# Patient Record
Sex: FEMALE | Race: WHITE | NOT HISPANIC OR LATINO | Employment: UNEMPLOYED | URBAN - METROPOLITAN AREA
[De-identification: names, ages, dates, MRNs, and addresses within clinical notes are randomized per-mention and may not be internally consistent; named-entity substitution may affect disease eponyms.]

---

## 2023-05-12 ENCOUNTER — HOSPITAL ENCOUNTER (EMERGENCY)
Facility: HOSPITAL | Age: 43
Discharge: HOME | End: 2023-05-12
Attending: SURGERY
Payer: MEDICAID

## 2023-05-12 VITALS
WEIGHT: 136.69 LBS | RESPIRATION RATE: 20 BRPM | SYSTOLIC BLOOD PRESSURE: 114 MMHG | TEMPERATURE: 98 F | OXYGEN SATURATION: 98 % | HEART RATE: 66 BPM | BODY MASS INDEX: 25.83 KG/M2 | DIASTOLIC BLOOD PRESSURE: 71 MMHG

## 2023-05-12 DIAGNOSIS — R21 RASH AND NONSPECIFIC SKIN ERUPTION: Primary | ICD-10-CM

## 2023-05-12 DIAGNOSIS — L30.9 DERMATITIS: ICD-10-CM

## 2023-05-12 PROCEDURE — 99284 EMERGENCY DEPT VISIT MOD MDM: CPT

## 2023-05-12 PROCEDURE — 96372 THER/PROPH/DIAG INJ SC/IM: CPT | Performed by: SURGERY

## 2023-05-12 PROCEDURE — 63600175 PHARM REV CODE 636 W HCPCS: Performed by: SURGERY

## 2023-05-12 RX ORDER — HYDROXYZINE PAMOATE 25 MG/1
25 CAPSULE ORAL 4 TIMES DAILY
Qty: 12 CAPSULE | Refills: 0 | Status: SHIPPED | OUTPATIENT
Start: 2023-05-12 | End: 2023-05-15

## 2023-05-12 RX ORDER — TRIAMCINOLONE ACETONIDE 1 MG/G
CREAM TOPICAL 2 TIMES DAILY
Qty: 45 G | Refills: 0 | Status: SHIPPED | OUTPATIENT
Start: 2023-05-12

## 2023-05-12 RX ORDER — DEXAMETHASONE SODIUM PHOSPHATE 4 MG/ML
8 INJECTION, SOLUTION INTRA-ARTICULAR; INTRALESIONAL; INTRAMUSCULAR; INTRAVENOUS; SOFT TISSUE
Status: COMPLETED | OUTPATIENT
Start: 2023-05-12 | End: 2023-05-12

## 2023-05-12 RX ADMIN — DEXAMETHASONE SODIUM PHOSPHATE 8 MG: 4 INJECTION, SOLUTION INTRA-ARTICULAR; INTRALESIONAL; INTRAMUSCULAR; INTRAVENOUS; SOFT TISSUE at 08:05

## 2023-05-12 NOTE — ED PROVIDER NOTES
Encounter Date: 5/12/2023       History     Chief Complaint   Patient presents with    Rash     Patient complains of rash to face which erupted last night.  Patient has baseline rosacea of the face and states that she received a shot for HIV prevention which I am not familiar with and also put on antibiotics which she does not know the name of for sinus infection but discontinued those weeks ago she denies any respiratory complaints the rash is confined to her face    The history is provided by the patient.   Rash   This is a new problem. The current episode started yesterday. The problem has been gradually worsening. The problem is associated with an unknown factor.   Review of patient's allergies indicates:   Allergen Reactions    Iodinated contrast media      No past medical history on file.  No past surgical history on file.  No family history on file.  Social History     Tobacco Use    Smoking status: Never     Review of Systems   Constitutional: Negative.    HENT: Negative.     Respiratory: Negative.     Cardiovascular: Negative.    Gastrointestinal: Negative.    Endocrine: Negative.    Genitourinary: Negative.    Musculoskeletal: Negative.    Skin:  Positive for rash.   Allergic/Immunologic: Negative.    Neurological: Negative.    Hematological: Negative.    Psychiatric/Behavioral: Negative.       Physical Exam     Initial Vitals [05/12/23 0810]   BP Pulse Resp Temp SpO2   114/71 66 20 97.6 °F (36.4 °C) 98 %      MAP       --         Physical Exam    Nursing note and vitals reviewed.  Constitutional: She appears well-developed and well-nourished.   HENT:   Mouth/Throat: Oropharynx is clear and moist.   Neck:   Normal range of motion.  Cardiovascular:  Normal rate.           Pulmonary/Chest: Breath sounds normal.   Musculoskeletal:      Cervical back: Normal range of motion.     Skin: Skin is warm. Rash noted.   Maculopapular eruption over both cheeks forehead and face no evidence of any impetigo    Psychiatric: She has a normal mood and affect.       ED Course   Procedures  Labs Reviewed - No data to display       Imaging Results    None          Medications   dexAMETHasone injection 8 mg (8 mg Intramuscular Given 5/12/23 3175)     Medical Decision Making:   Initial Assessment:   Facial dermatitis  ED Management:  Unknown allergen possible antibiotics or a shot she received recommend treatment with steroids and follow up as needed                        Clinical Impression:   Final diagnoses:  [R21] Rash and nonspecific skin eruption (Primary)  [L30.9] Dermatitis        ED Disposition Condition    Discharge Stable          ED Prescriptions       Medication Sig Dispense Start Date End Date Auth. Provider    triamcinolone acetonide 0.1% (KENALOG) 0.1 % cream Apply topically 2 (two) times daily. 45 g 5/12/2023 -- BRIAN Bucio III, MD    hydrOXYzine pamoate (VISTARIL) 25 MG Cap Take 1 capsule (25 mg total) by mouth 4 (four) times daily. for 3 days 12 capsule 5/12/2023 5/15/2023 BRIAN Bucio III, MD          Follow-up Information    None          BRIAN Bucio III, MD  05/12/23 4618

## 2023-05-12 NOTE — ED TRIAGE NOTES
42 y.o. female presents to ER Room/bed info not found   Chief Complaint   Patient presents with    Rash   .   C/o rash to face since last night

## 2023-07-05 ENCOUNTER — HOSPITAL ENCOUNTER (EMERGENCY)
Facility: HOSPITAL | Age: 43
Discharge: HOME | End: 2023-07-05
Attending: STUDENT IN AN ORGANIZED HEALTH CARE EDUCATION/TRAINING PROGRAM
Payer: MEDICAID

## 2023-07-05 VITALS
BODY MASS INDEX: 25.66 KG/M2 | WEIGHT: 135.94 LBS | SYSTOLIC BLOOD PRESSURE: 105 MMHG | DIASTOLIC BLOOD PRESSURE: 62 MMHG | RESPIRATION RATE: 18 BRPM | HEART RATE: 59 BPM | OXYGEN SATURATION: 98 % | HEIGHT: 61 IN | TEMPERATURE: 97 F

## 2023-07-05 DIAGNOSIS — R21 RASH: Primary | ICD-10-CM

## 2023-07-05 LAB — B-HCG UR QL: NEGATIVE

## 2023-07-05 PROCEDURE — 96372 THER/PROPH/DIAG INJ SC/IM: CPT | Mod: 59 | Performed by: STUDENT IN AN ORGANIZED HEALTH CARE EDUCATION/TRAINING PROGRAM

## 2023-07-05 PROCEDURE — 81025 URINE PREGNANCY TEST: CPT | Performed by: STUDENT IN AN ORGANIZED HEALTH CARE EDUCATION/TRAINING PROGRAM

## 2023-07-05 PROCEDURE — 25000003 PHARM REV CODE 250: Performed by: STUDENT IN AN ORGANIZED HEALTH CARE EDUCATION/TRAINING PROGRAM

## 2023-07-05 PROCEDURE — 99284 EMERGENCY DEPT VISIT MOD MDM: CPT | Mod: 25

## 2023-07-05 PROCEDURE — 96374 THER/PROPH/DIAG INJ IV PUSH: CPT

## 2023-07-05 PROCEDURE — 63600175 PHARM REV CODE 636 W HCPCS: Performed by: STUDENT IN AN ORGANIZED HEALTH CARE EDUCATION/TRAINING PROGRAM

## 2023-07-05 RX ORDER — DIPHENHYDRAMINE HYDROCHLORIDE 50 MG/ML
25 INJECTION INTRAMUSCULAR; INTRAVENOUS
Status: COMPLETED | OUTPATIENT
Start: 2023-07-05 | End: 2023-07-05

## 2023-07-05 RX ORDER — DIPHENHYDRAMINE HCL 25 MG
25 CAPSULE ORAL EVERY 6 HOURS PRN
Qty: 20 CAPSULE | Refills: 0 | Status: ON HOLD | OUTPATIENT
Start: 2023-07-05 | End: 2023-10-17 | Stop reason: HOSPADM

## 2023-07-05 RX ORDER — CEPHALEXIN 500 MG/1
500 CAPSULE ORAL 4 TIMES DAILY
Qty: 20 CAPSULE | Refills: 0 | Status: SHIPPED | OUTPATIENT
Start: 2023-07-05 | End: 2023-07-10

## 2023-07-05 RX ORDER — CEPHALEXIN 500 MG/1
500 CAPSULE ORAL
Status: COMPLETED | OUTPATIENT
Start: 2023-07-05 | End: 2023-07-05

## 2023-07-05 RX ORDER — DEXAMETHASONE SODIUM PHOSPHATE 4 MG/ML
8 INJECTION, SOLUTION INTRA-ARTICULAR; INTRALESIONAL; INTRAMUSCULAR; INTRAVENOUS; SOFT TISSUE
Status: COMPLETED | OUTPATIENT
Start: 2023-07-05 | End: 2023-07-05

## 2023-07-05 RX ADMIN — DIPHENHYDRAMINE HYDROCHLORIDE 25 MG: 50 INJECTION, SOLUTION INTRAMUSCULAR; INTRAVENOUS at 08:07

## 2023-07-05 RX ADMIN — DEXAMETHASONE SODIUM PHOSPHATE 8 MG: 4 INJECTION, SOLUTION INTRA-ARTICULAR; INTRALESIONAL; INTRAMUSCULAR; INTRAVENOUS; SOFT TISSUE at 08:07

## 2023-07-05 RX ADMIN — CEPHALEXIN 500 MG: 500 CAPSULE ORAL at 08:07

## 2023-07-05 NOTE — ED PROVIDER NOTES
Encounter Date: 7/5/2023       History     Chief Complaint   Patient presents with    Rash     42-year-old female with history of contrast allergy presenting with rash to her face and arms.  Patient reports that she has had rashes like this previously, they currently occur about once a month.  Patient reports that this bout is worse than usual, and she is developed pustules on the left side of her face.  She was seen in an emergency room yesterday, and given a steroid cream.  Patient reports limited improvement with this.  She reports that one month ago she was treated with prednisone which resolved her condition.  She denies fever, but states that her face does feel warm.  No trouble swallowing or trouble breathing.  Patient reports she is started nursing rash to bilateral arms now.  No chest tightness or shortness of breath.  Yesterday patient had reported a numbness to her right lip, however states this has totally resolved.  No numbness or weakness at this time.    Review of patient's allergies indicates:   Allergen Reactions    Iodinated contrast media      History reviewed. No pertinent past medical history.  History reviewed. No pertinent surgical history.  History reviewed. No pertinent family history.  Social History     Tobacco Use    Smoking status: Never   Substance Use Topics    Alcohol use: Yes     Comment: occ     Review of Systems   Constitutional:  Negative for fever.   HENT:  Negative for sore throat.    Respiratory:  Negative for shortness of breath.    Cardiovascular:  Negative for chest pain.   Gastrointestinal:  Negative for nausea.   Genitourinary:  Negative for dysuria.   Musculoskeletal:  Negative for back pain.   Skin:  Positive for rash.   Neurological:  Negative for weakness and numbness.   Hematological:  Does not bruise/bleed easily.     Physical Exam     Initial Vitals [07/05/23 0744]   BP Pulse Resp Temp SpO2   122/73 65 18 99 °F (37.2 °C) 98 %      MAP       --         Physical  Exam    Nursing note and vitals reviewed.  Constitutional: She appears well-developed.   HENT:   Head: Normocephalic.   Eyes: Pupils are equal, round, and reactive to light.   Neck:   Normal range of motion.  Cardiovascular:            No murmur heard.  Pulmonary/Chest: No respiratory distress.   Abdominal: Abdomen is soft.   Musculoskeletal:         General: No edema.      Cervical back: Normal range of motion.     Neurological: She is alert.   Patient alert and oriented x3, fluent with speech and full comprehension.  5/5 strength bilateral upper and lower extremities.  Ambulatory with normal gait.  Patient has no facial droop.   Skin: Skin is warm.   Pustular rash to bilateral cheeks and chin.  There are no vesicular lesions.  The rash is bilateral.  No signs of urticarial rash.  There is very mild erythema and pustules to bilateral upper arms.   Psychiatric: She has a normal mood and affect.                 ED Course   Procedures  Labs Reviewed   PREGNANCY TEST, URINE RAPID    Narrative:     Specimen Source->Urine          Imaging Results    None          Medications   dexAMETHasone injection 8 mg (8 mg Intravenous Given 7/5/23 0816)   diphenhydrAMINE injection 25 mg (25 mg Intramuscular Given 7/5/23 0816)   cephALEXin capsule 500 mg (500 mg Oral Given 7/5/23 0816)     Medical Decision Making:   Differential Diagnosis:   DDX:  Rash.  Concern for possible allergic reaction versus cellulitis. Unlikely necrotizing fasciitis given history, exam, nontoxic appearing, no crepitus, no risk factors.  Do not suspect rashes patient has no neurologic deficits on my exam, and had recent negative CT.  DX:  U preg  TX:  Steroid, antibiotic, Benadryl  Dispo:  Discharge with close dermatology follow-up           ED Course as of 07/05/23 0902 Wed Jul 05, 2023   0901 Jesus Snyder 9:01 AM  Symptoms improved. Discussed results, findings, supportive care, follow up recommendations, and return to ED precautions with this  patient. Patient verbalized understanding and agreement.  Patient is stable for discharge at this time.   [NB]   0902 Do not suspect TN or SJS as patient has not started any new medications, and there are no oral ulcerations or rash.  No palmar rash. [NB]      ED Course User Index  [NB] Jesus Snyder MD                 Clinical Impression:   Final diagnoses:  [R21] Rash (Primary)        ED Disposition Condition    Discharge Stable          ED Prescriptions       Medication Sig Dispense Start Date End Date Auth. Provider    diphenhydrAMINE (BENADRYL) 25 mg capsule Take 1 capsule (25 mg total) by mouth every 6 (six) hours as needed for Itching or Allergies. 20 capsule 7/5/2023 -- Jesus Snyder MD    cephALEXin (KEFLEX) 500 MG capsule Take 1 capsule (500 mg total) by mouth 4 (four) times daily. for 5 days 20 capsule 7/5/2023 7/10/2023 Jesus Snyder MD          Follow-up Information       Follow up With Specialties Details Why Contact Info    [facility] - Emergency Dept Emergency Medicine Schedule an appointment as soon as possible for a visit in 2 days  [address] [facility] [address]  [phone]    [facility] DERMATOLOGY Dermatology Schedule an appointment as soon as possible for a visit in 2 days  [address] [facility] [address]  [phone]             Jesus Snyder MD  07/05/23 0801       Jesus Snyder MD  07/05/23 0804       Jesus Snyder MD  07/05/23 0903

## 2023-07-05 NOTE — DISCHARGE INSTRUCTIONS
Please follow up with your primary care physician within 2 days. Ensure that you review all lab work results and/or imaging results. If you have any questions about your discharge paperwork please call the Emergency Department.     Return to the Emergency Department if any fever, increasing pain, redness, or discharge appear.     If you were prescribed antibiotics, please take them to completion. If you were prescribed a narcotic or any sedating medication, do not drive or operate heavy equipment or machinery while taking these medications.  If you were diagnosed with a seizure, syncope, any loss of consciousness or decreased alertness, do not drive, swim, operate heavy machinery, or per yourself in any position where a sudden loss of consciousness could put herself or others in danger.    Thank you for visiting [facility], Department of Emergency Medicine. Please see the entirety of the educational materials provided. Please note that a visit to the emergency department does not substitute ongoing care from a primary medical provider or specialist. Please ensure to follow up as recommended. However, please return to the emergency department immediately if symptoms do not improve as discussed, symptoms worsen, new symptoms develop, difficulty in following up or for any of your concerns or issues. Please note on discharge you are acknowledging understanding and agreement on medical evaluation, management recommendations and follow up recommendations.

## 2023-07-05 NOTE — Clinical Note
"Darren Trinidad" Javan was seen and treated in our emergency department on 7/5/2023.  She may return to work on 07/07/2023.       If you have any questions or concerns, please don't hesitate to call.       RN    "

## 2023-07-05 NOTE — ED TRIAGE NOTES
C/o rash to face since Monday. Patient states was seen at Urgent Care, but now redness is worse and now has rash on her arms. Patient reports the ft side of face is painful and feels numb.

## 2023-10-06 PROBLEM — F33.2 MAJOR DEPRESSIVE DISORDER, RECURRENT SEVERE WITHOUT PSYCHOTIC FEATURES: Status: ACTIVE | Noted: 2023-10-06

## 2024-02-10 ENCOUNTER — HOSPITAL ENCOUNTER (EMERGENCY)
Facility: HOSPITAL | Age: 44
Discharge: HOME | End: 2024-02-10
Attending: EMERGENCY MEDICINE
Payer: MEDICAID

## 2024-02-10 VITALS
HEIGHT: 61 IN | HEART RATE: 61 BPM | WEIGHT: 130 LBS | DIASTOLIC BLOOD PRESSURE: 57 MMHG | RESPIRATION RATE: 18 BRPM | SYSTOLIC BLOOD PRESSURE: 123 MMHG | BODY MASS INDEX: 24.55 KG/M2 | OXYGEN SATURATION: 99 % | TEMPERATURE: 98 F

## 2024-02-10 DIAGNOSIS — R00.2 PALPITATIONS: Primary | ICD-10-CM

## 2024-02-10 LAB
ALBUMIN SERPL BCP-MCNC: 4.2 G/DL (ref 3.5–5.2)
ALP SERPL-CCNC: 85 U/L (ref 55–135)
ALT SERPL W/O P-5'-P-CCNC: 42 U/L (ref 10–44)
AMPHET+METHAMPHET UR QL: NEGATIVE
ANION GAP SERPL CALC-SCNC: 9 MMOL/L (ref 8–16)
AST SERPL-CCNC: 22 U/L (ref 10–40)
B-HCG UR QL: NEGATIVE
BARBITURATES UR QL SCN>200 NG/ML: NEGATIVE
BASOPHILS # BLD AUTO: 0.05 K/UL (ref 0–0.2)
BASOPHILS NFR BLD: 0.6 % (ref 0–1.9)
BENZODIAZ UR QL SCN>200 NG/ML: NEGATIVE
BILIRUB SERPL-MCNC: 0.3 MG/DL (ref 0.1–1)
BILIRUB UR QL STRIP: NEGATIVE
BUN SERPL-MCNC: 12 MG/DL (ref 6–20)
BZE UR QL SCN: NEGATIVE
CALCIUM SERPL-MCNC: 9.3 MG/DL (ref 8.7–10.5)
CANNABINOIDS UR QL SCN: NEGATIVE
CHLORIDE SERPL-SCNC: 109 MMOL/L (ref 95–110)
CLARITY UR: CLEAR
CO2 SERPL-SCNC: 22 MMOL/L (ref 23–29)
COLOR UR: YELLOW
CREAT SERPL-MCNC: 0.8 MG/DL (ref 0.5–1.4)
CREAT UR-MCNC: 268.7 MG/DL (ref 15–325)
DIFFERENTIAL METHOD BLD: NORMAL
EOSINOPHIL # BLD AUTO: 0.2 K/UL (ref 0–0.5)
EOSINOPHIL NFR BLD: 2.1 % (ref 0–8)
ERYTHROCYTE [DISTWIDTH] IN BLOOD BY AUTOMATED COUNT: 12.6 % (ref 11.5–14.5)
EST. GFR  (NO RACE VARIABLE): >60 ML/MIN/1.73 M^2
GLUCOSE SERPL-MCNC: 94 MG/DL (ref 70–110)
GLUCOSE UR QL STRIP: NEGATIVE
HCT VFR BLD AUTO: 38.1 % (ref 37–48.5)
HGB BLD-MCNC: 12.7 G/DL (ref 12–16)
HGB UR QL STRIP: NEGATIVE
IMM GRANULOCYTES # BLD AUTO: 0.03 K/UL (ref 0–0.04)
IMM GRANULOCYTES NFR BLD AUTO: 0.4 % (ref 0–0.5)
INFLUENZA A, MOLECULAR: NEGATIVE
INFLUENZA B, MOLECULAR: NEGATIVE
KETONES UR QL STRIP: NEGATIVE
LEUKOCYTE ESTERASE UR QL STRIP: NEGATIVE
LYMPHOCYTES # BLD AUTO: 2 K/UL (ref 1–4.8)
LYMPHOCYTES NFR BLD: 24.7 % (ref 18–48)
MAGNESIUM SERPL-MCNC: 2 MG/DL (ref 1.6–2.6)
MCH RBC QN AUTO: 30 PG (ref 27–31)
MCHC RBC AUTO-ENTMCNC: 33.3 G/DL (ref 32–36)
MCV RBC AUTO: 90 FL (ref 82–98)
METHADONE UR QL SCN>300 NG/ML: NEGATIVE
MONOCYTES # BLD AUTO: 0.5 K/UL (ref 0.3–1)
MONOCYTES NFR BLD: 6.7 % (ref 4–15)
NEUTROPHILS # BLD AUTO: 5.2 K/UL (ref 1.8–7.7)
NEUTROPHILS NFR BLD: 65.5 % (ref 38–73)
NITRITE UR QL STRIP: NEGATIVE
NRBC BLD-RTO: 0 /100 WBC
OPIATES UR QL SCN: NEGATIVE
PCP UR QL SCN>25 NG/ML: NEGATIVE
PH UR STRIP: 6 [PH] (ref 5–8)
PLATELET # BLD AUTO: 235 K/UL (ref 150–450)
PMV BLD AUTO: 11.1 FL (ref 9.2–12.9)
POTASSIUM SERPL-SCNC: 4.2 MMOL/L (ref 3.5–5.1)
PROT SERPL-MCNC: 7.6 G/DL (ref 6–8.4)
PROT UR QL STRIP: NEGATIVE
RBC # BLD AUTO: 4.24 M/UL (ref 4–5.4)
SARS-COV-2 RDRP RESP QL NAA+PROBE: NEGATIVE
SODIUM SERPL-SCNC: 140 MMOL/L (ref 136–145)
SP GR UR STRIP: >=1.03 (ref 1–1.03)
SPECIMEN SOURCE: NORMAL
TOXICOLOGY INFORMATION: NORMAL
TSH SERPL DL<=0.005 MIU/L-ACNC: 0.85 UIU/ML (ref 0.4–4)
URN SPEC COLLECT METH UR: ABNORMAL
UROBILINOGEN UR STRIP-ACNC: NEGATIVE EU/DL
WBC # BLD AUTO: 7.93 K/UL (ref 3.9–12.7)

## 2024-02-10 PROCEDURE — 80307 DRUG TEST PRSMV CHEM ANLYZR: CPT

## 2024-02-10 PROCEDURE — 99284 EMERGENCY DEPT VISIT MOD MDM: CPT | Mod: 25

## 2024-02-10 PROCEDURE — U0002 COVID-19 LAB TEST NON-CDC: HCPCS | Performed by: EMERGENCY MEDICINE

## 2024-02-10 PROCEDURE — 84443 ASSAY THYROID STIM HORMONE: CPT | Performed by: EMERGENCY MEDICINE

## 2024-02-10 PROCEDURE — 81025 URINE PREGNANCY TEST: CPT

## 2024-02-10 PROCEDURE — 93005 ELECTROCARDIOGRAM TRACING: CPT

## 2024-02-10 PROCEDURE — 93010 ELECTROCARDIOGRAM REPORT: CPT | Mod: ,,, | Performed by: INTERNAL MEDICINE

## 2024-02-10 PROCEDURE — 80053 COMPREHEN METABOLIC PANEL: CPT | Performed by: EMERGENCY MEDICINE

## 2024-02-10 PROCEDURE — 81003 URINALYSIS AUTO W/O SCOPE: CPT | Mod: 59

## 2024-02-10 PROCEDURE — 85025 COMPLETE CBC W/AUTO DIFF WBC: CPT | Performed by: EMERGENCY MEDICINE

## 2024-02-10 PROCEDURE — 83735 ASSAY OF MAGNESIUM: CPT | Performed by: EMERGENCY MEDICINE

## 2024-02-10 PROCEDURE — 87502 INFLUENZA DNA AMP PROBE: CPT | Performed by: EMERGENCY MEDICINE

## 2024-02-10 NOTE — ED PROVIDER NOTES
[facility]                                                  Chief Complaint  43 y.o. female with Palpitations    History of Present Illness  Darren Edouard presents to the emergency room with complaints of palpitations and body aches over the last day and a half.  Patient states that she is generally weak and feeling unwell.  She is having some blurry vision.  She does have a history of anxiety but is unsure if this is anxiety related.    Past Medical History:   Diagnosis Date    Depression      History reviewed. No pertinent surgical history.   Review of patient's allergies indicates:   Allergen Reactions    Iodinated contrast media         Review of Systems and Physical Exam     Review of Systems  -- Constitution - no fever, no weight loss, no loss of consciousness  -- Eyes reports intermittent blurry vision, no redness, no swelling  -- Ear, Nose - no  earache, denies congestion  -- Mouth,Throat - no sore throat, no toothache, normal voice, normal swallowing  -- Respiratory - denies cough and congestion, no shortness of breath, no wheezing  -- Cardiovascular - denies chest pain, reports palpitations,   -- Gastrointestinal - denies abdominal pain, denies nausea, vomiting, and diarrhea  -- Genitourinary - no dysuria, no denies flank pain, no hematuria or frequency   -- Musculoskeletal - denies back pain, negative for myalgias and arthralgias   -- Neurological - no headache, no neurologic changes, no loss of bladder or bowel function no seizure like activity, no changes in hearing or vision  -- Skin - denies skin changes, no rash, no hives, no suspected skin infection    Vital Signs   weight is 59 kg (130 lb). Her temperature is 98 °F (36.7 °C). Her blood pressure is 132/70 and her pulse is 77. Her respiration is 18 and oxygen saturation is 97%.      Physical Exam  -- Nursing note and vitals reviewed  -- Constitutional:  Awake alert and oriented, GCS 15, no acute distress.  Appears well.  --  Head: Atraumatic. Normocephalic. No obvious abnormality  -- Eyes: Pupils are equal and reactive to light. Extraocular movements intact. No nystagmus.  No periorbital swelling. Normal conjunctiva.  -- Nose: Nose grossly normal in appearance, nares grossly normal. No rhinorrhea.  -- Throat: Mucous membranes moist, pharynx normal, normal tonsils.  Airway patent.  -- Ears: External ears and TM normal bilaterally. Normal hearing.   -- Neck: Normal range of motion. Neck supple. No meningismus. No adenopathy  -- Cardiac: Normal rate, regular rhythm and normal heart sounds. No carotid bruit. No lower extremity edema.  -- Pulmonary: Normal respiratory effort, breath sounds equal bilaterally. Adequate flow.  No wheezing.  No crackles.  -- Abdominal: Soft, no tenderness, no guarding, no rebound. Normal bowel sounds.   -- Musculoskeletal: Normal range of motion, all 4 extremities 5/5 strength.  Neurovascularly intact. Atraumatic. No deformities.  -- Neurological:  Cranial nerves 2-12 grossly intact. No focal deficits.   -- Vascular: Posterior tibial, dorsalis pedis and radial pulses 2+ bilaterally    -- Lymphatics: No cervical or peripheral lymphadenopathy.   -- Skin: Warm and dry. No evidence of rash or cellulitis  -- Psychiatric: Normal mood and affect. Bedside behavior is appropriate.  Patient is cooperative.  Denies suicidal homicidal ideation.    Emergency Room Course     Treatment Course, Evaluation, and Medical Decision Making  1. Physical exam unremarkable   2. EKG normal sinus rhythm no evidence of ischemia or arrhythmia   3. Urine pregnancy test negative  4. Urinalysis within normal limits  5. Drug screen negative   6. CBC/CMP within normal limits  7. Magnesium within normal limits  8. TSH within normal limits  9. COVID negative  10. Influenza  negative   11. Discharge home      Abnormal lab values  Labs Reviewed   INFLUENZA A & B BY MOLECULAR   URINALYSIS, REFLEX TO URINE CULTURE   PREGNANCY TEST, URINE RAPID   DRUG  SCREEN PANEL, URINE EMERGENCY   CBC W/ AUTO DIFFERENTIAL   COMPREHENSIVE METABOLIC PANEL   MAGNESIUM   TSH   SARS-COV-2 RNA AMPLIFICATION, QUAL       Medications Given  Medications - No data to display      Diagnosis  -- The encounter diagnosis was Palpitations.  --suspected anxiety    Disposition and Plan  -- Disposition: home  -- Condition: stable  -- Follow-up: Patient to follow up with No, Primary Doctor in 1-2 days, and any specialists noted on discharge paperwork  -- I advised the patient that we have found no life threatening condition today  -- At this time, I believe the patient is clinically stable for discharge.   -- The patient acknowledges that close follow up with a MD is required   -- Patient agrees to comply with all instruction and direction given in the ER  -- Patient counseled on strict return precautions as discussed       Francoise Montana MD  02/10/24 9526

## 2024-02-10 NOTE — ED TRIAGE NOTES
Patient to ER CC of palpitations, dizziness, and blurry vision started 2 days ago, states she has a HX of anxiety and is not sure if that has anything to do with it

## 2024-02-12 LAB
OHS QRS DURATION: 76 MS
OHS QTC CALCULATION: 441 MS

## 2024-06-29 ENCOUNTER — HOSPITAL ENCOUNTER (EMERGENCY)
Facility: HOSPITAL | Age: 44
Discharge: HOME | End: 2024-06-29
Attending: SURGERY
Payer: MEDICAID

## 2024-06-29 VITALS
RESPIRATION RATE: 18 BRPM | OXYGEN SATURATION: 99 % | SYSTOLIC BLOOD PRESSURE: 100 MMHG | WEIGHT: 127 LBS | DIASTOLIC BLOOD PRESSURE: 50 MMHG | BODY MASS INDEX: 23.98 KG/M2 | HEART RATE: 78 BPM | TEMPERATURE: 99 F | HEIGHT: 61 IN

## 2024-06-29 DIAGNOSIS — E86.0 DEHYDRATION: ICD-10-CM

## 2024-06-29 DIAGNOSIS — A08.4 VIRAL GASTROENTERITIS: Primary | ICD-10-CM

## 2024-06-29 DIAGNOSIS — G44.209 TENSION HEADACHE: ICD-10-CM

## 2024-06-29 LAB
ALBUMIN SERPL BCP-MCNC: 4.2 G/DL (ref 3.5–5.2)
ALP SERPL-CCNC: 91 U/L (ref 55–135)
ALT SERPL W/O P-5'-P-CCNC: 19 U/L (ref 10–44)
ANION GAP SERPL CALC-SCNC: 10 MMOL/L (ref 8–16)
AST SERPL-CCNC: 20 U/L (ref 10–40)
B-HCG UR QL: NEGATIVE
BASOPHILS # BLD AUTO: 0.01 K/UL (ref 0–0.2)
BASOPHILS NFR BLD: 0.1 % (ref 0–1.9)
BILIRUB SERPL-MCNC: 1 MG/DL (ref 0.1–1)
BILIRUB UR QL STRIP: NEGATIVE
BUN SERPL-MCNC: 10 MG/DL (ref 6–20)
CALCIUM SERPL-MCNC: 9.5 MG/DL (ref 8.7–10.5)
CHLORIDE SERPL-SCNC: 105 MMOL/L (ref 95–110)
CLARITY UR: CLEAR
CO2 SERPL-SCNC: 20 MMOL/L (ref 23–29)
COLOR UR: YELLOW
CREAT SERPL-MCNC: 0.8 MG/DL (ref 0.5–1.4)
DIFFERENTIAL METHOD BLD: ABNORMAL
EOSINOPHIL # BLD AUTO: 0 K/UL (ref 0–0.5)
EOSINOPHIL NFR BLD: 0.1 % (ref 0–8)
ERYTHROCYTE [DISTWIDTH] IN BLOOD BY AUTOMATED COUNT: 12.5 % (ref 11.5–14.5)
EST. GFR  (NO RACE VARIABLE): >60 ML/MIN/1.73 M^2
GLUCOSE SERPL-MCNC: 112 MG/DL (ref 70–110)
GLUCOSE UR QL STRIP: NEGATIVE
HCT VFR BLD AUTO: 36.2 % (ref 37–48.5)
HGB BLD-MCNC: 12.8 G/DL (ref 12–16)
HGB UR QL STRIP: NEGATIVE
IMM GRANULOCYTES # BLD AUTO: 0.03 K/UL (ref 0–0.04)
IMM GRANULOCYTES NFR BLD AUTO: 0.4 % (ref 0–0.5)
INFLUENZA A, MOLECULAR: NEGATIVE
INFLUENZA B, MOLECULAR: NEGATIVE
KETONES UR QL STRIP: NEGATIVE
LEUKOCYTE ESTERASE UR QL STRIP: NEGATIVE
LIPASE SERPL-CCNC: 31 U/L (ref 4–60)
LYMPHOCYTES # BLD AUTO: 0.3 K/UL (ref 1–4.8)
LYMPHOCYTES NFR BLD: 3.9 % (ref 18–48)
MCH RBC QN AUTO: 30.3 PG (ref 27–31)
MCHC RBC AUTO-ENTMCNC: 35.4 G/DL (ref 32–36)
MCV RBC AUTO: 86 FL (ref 82–98)
MONOCYTES # BLD AUTO: 0.2 K/UL (ref 0.3–1)
MONOCYTES NFR BLD: 2.3 % (ref 4–15)
NEUTROPHILS # BLD AUTO: 6.5 K/UL (ref 1.8–7.7)
NEUTROPHILS NFR BLD: 93.2 % (ref 38–73)
NITRITE UR QL STRIP: NEGATIVE
NRBC BLD-RTO: 0 /100 WBC
PH UR STRIP: 8 [PH] (ref 5–8)
PLATELET # BLD AUTO: 236 K/UL (ref 150–450)
PMV BLD AUTO: 10.3 FL (ref 9.2–12.9)
POTASSIUM SERPL-SCNC: 3.9 MMOL/L (ref 3.5–5.1)
PROT SERPL-MCNC: 7.6 G/DL (ref 6–8.4)
PROT UR QL STRIP: NEGATIVE
RBC # BLD AUTO: 4.22 M/UL (ref 4–5.4)
SARS-COV-2 RDRP RESP QL NAA+PROBE: NEGATIVE
SODIUM SERPL-SCNC: 135 MMOL/L (ref 136–145)
SP GR UR STRIP: 1.02 (ref 1–1.03)
SPECIMEN SOURCE: NORMAL
URN SPEC COLLECT METH UR: ABNORMAL
UROBILINOGEN UR STRIP-ACNC: ABNORMAL EU/DL
WBC # BLD AUTO: 7 K/UL (ref 3.9–12.7)

## 2024-06-29 PROCEDURE — 81003 URINALYSIS AUTO W/O SCOPE: CPT | Performed by: SURGERY

## 2024-06-29 PROCEDURE — 63600175 PHARM REV CODE 636 W HCPCS: Performed by: SURGERY

## 2024-06-29 PROCEDURE — 80053 COMPREHEN METABOLIC PANEL: CPT | Performed by: SURGERY

## 2024-06-29 PROCEDURE — 96375 TX/PRO/DX INJ NEW DRUG ADDON: CPT

## 2024-06-29 PROCEDURE — 83690 ASSAY OF LIPASE: CPT | Performed by: SURGERY

## 2024-06-29 PROCEDURE — U0002 COVID-19 LAB TEST NON-CDC: HCPCS | Performed by: SURGERY

## 2024-06-29 PROCEDURE — 99284 EMERGENCY DEPT VISIT MOD MDM: CPT | Mod: 25

## 2024-06-29 PROCEDURE — 25000003 PHARM REV CODE 250: Performed by: SURGERY

## 2024-06-29 PROCEDURE — 85025 COMPLETE CBC W/AUTO DIFF WBC: CPT | Performed by: SURGERY

## 2024-06-29 PROCEDURE — 81025 URINE PREGNANCY TEST: CPT | Performed by: SURGERY

## 2024-06-29 PROCEDURE — 96374 THER/PROPH/DIAG INJ IV PUSH: CPT

## 2024-06-29 PROCEDURE — 96361 HYDRATE IV INFUSION ADD-ON: CPT

## 2024-06-29 PROCEDURE — 87502 INFLUENZA DNA AMP PROBE: CPT | Performed by: SURGERY

## 2024-06-29 RX ORDER — IBUPROFEN 600 MG/1
600 TABLET ORAL
Status: COMPLETED | OUTPATIENT
Start: 2024-06-29 | End: 2024-06-29

## 2024-06-29 RX ORDER — BUTALBITAL, ACETAMINOPHEN AND CAFFEINE 50; 325; 40 MG/1; MG/1; MG/1
1 TABLET ORAL EVERY 4 HOURS PRN
Qty: 18 TABLET | Refills: 0 | Status: SHIPPED | OUTPATIENT
Start: 2024-06-29 | End: 2024-07-02

## 2024-06-29 RX ORDER — KETOROLAC TROMETHAMINE 30 MG/ML
30 INJECTION, SOLUTION INTRAMUSCULAR; INTRAVENOUS
Status: COMPLETED | OUTPATIENT
Start: 2024-06-29 | End: 2024-06-29

## 2024-06-29 RX ORDER — ONDANSETRON HYDROCHLORIDE 2 MG/ML
8 INJECTION, SOLUTION INTRAVENOUS
Status: COMPLETED | OUTPATIENT
Start: 2024-06-29 | End: 2024-06-29

## 2024-06-29 RX ORDER — ONDANSETRON 8 MG/1
8 TABLET, ORALLY DISINTEGRATING ORAL EVERY 8 HOURS PRN
Qty: 12 TABLET | Refills: 0 | Status: SHIPPED | OUTPATIENT
Start: 2024-06-29 | End: 2024-07-03

## 2024-06-29 RX ORDER — HYDROCODONE BITARTRATE AND ACETAMINOPHEN 5; 325 MG/1; MG/1
1 TABLET ORAL
Status: COMPLETED | OUTPATIENT
Start: 2024-06-29 | End: 2024-06-29

## 2024-06-29 RX ADMIN — ONDANSETRON 8 MG: 2 INJECTION INTRAMUSCULAR; INTRAVENOUS at 09:06

## 2024-06-29 RX ADMIN — SODIUM CHLORIDE 1000 ML: 0.9 SOLUTION INTRAVENOUS at 10:06

## 2024-06-29 RX ADMIN — IBUPROFEN 600 MG: 600 TABLET ORAL at 12:06

## 2024-06-29 RX ADMIN — SODIUM CHLORIDE 1000 ML: 9 INJECTION, SOLUTION INTRAVENOUS at 09:06

## 2024-06-29 RX ADMIN — HYDROCODONE BITARTRATE AND ACETAMINOPHEN 1 TABLET: 5; 325 TABLET ORAL at 09:06

## 2024-06-29 RX ADMIN — KETOROLAC TROMETHAMINE 30 MG: 30 INJECTION, SOLUTION INTRAMUSCULAR at 09:06

## 2024-06-29 RX ADMIN — SODIUM CHLORIDE 1000 ML: 9 INJECTION, SOLUTION INTRAVENOUS at 12:06

## 2024-06-29 NOTE — ED PROVIDER NOTES
Encounter Date: 6/29/2024       History     Chief Complaint   Patient presents with    Chills    Vomiting    Fever     43-year-old with chills fever vomiting which started 5:00 a.m. she has mild lower abdominal pain      Emesis   This is a new problem. The current episode started 2 to 3 hours ago. The problem occurs intermittently. The problem has been unchanged. The emesis has an appearance of stomach contents and bilious material. Associated symptoms include chills.     Review of patient's allergies indicates:   Allergen Reactions    Iodinated contrast media      Past Medical History:   Diagnosis Date    Depression      History reviewed. No pertinent surgical history.  No family history on file.  Social History     Tobacco Use    Smoking status: Never   Substance Use Topics    Alcohol use: Yes     Comment: occ    Drug use: Never     Review of Systems   Constitutional:  Positive for chills.   HENT: Negative.     Eyes: Negative.    Respiratory: Negative.     Cardiovascular: Negative.    Gastrointestinal:  Positive for vomiting.   Endocrine: Negative.    Genitourinary: Negative.    Musculoskeletal: Negative.    Skin: Negative.    Allergic/Immunologic: Negative.    Neurological: Negative.    Hematological: Negative.    Psychiatric/Behavioral: Negative.         Physical Exam     Initial Vitals [06/29/24 0859]   BP Pulse Resp Temp SpO2   117/60 106 18 98.9 °F (37.2 °C) 97 %      MAP       --         Physical Exam    Nursing note and vitals reviewed.  Constitutional: She appears well-developed and well-nourished.   HENT:   Head: Normocephalic.   Eyes: Conjunctivae are normal.   Neck: No tracheal deviation present.   No meningeal sign   Normal range of motion.  Cardiovascular:  Normal rate.           Pulmonary/Chest: Breath sounds normal.   Abdominal: Abdomen is soft.   Musculoskeletal:         General: Normal range of motion.      Cervical back: Normal range of motion.     Lymphadenopathy:     She has no cervical  adenopathy.   Neurological: She is alert and oriented to person, place, and time. GCS score is 15. GCS eye subscore is 4. GCS verbal subscore is 5. GCS motor subscore is 6.   Skin: Skin is warm.   Psychiatric: She has a normal mood and affect.         ED Course   Procedures  Labs Reviewed   URINALYSIS, REFLEX TO URINE CULTURE - Abnormal; Notable for the following components:       Result Value    Urobilinogen, UA 2.0-3.0 (*)     All other components within normal limits    Narrative:     Specimen Source->Urine   CBC W/ AUTO DIFFERENTIAL - Abnormal; Notable for the following components:    Hematocrit 36.2 (*)     Lymph # 0.3 (*)     Mono # 0.2 (*)     Gran % 93.2 (*)     Lymph % 3.9 (*)     Mono % 2.3 (*)     All other components within normal limits   COMPREHENSIVE METABOLIC PANEL - Abnormal; Notable for the following components:    Sodium 135 (*)     CO2 20 (*)     Glucose 112 (*)     All other components within normal limits   INFLUENZA A & B BY MOLECULAR   SARS-COV-2 RNA AMPLIFICATION, QUAL   PREGNANCY TEST, URINE RAPID    Narrative:     Specimen Source->Urine   LIPASE          Imaging Results    None          Medications   sodium chloride 0.9% bolus 1,000 mL 1,000 mL (0 mLs Intravenous Stopped 6/29/24 1047)   HYDROcodone-acetaminophen 5-325 mg per tablet 1 tablet (1 tablet Oral Given 6/29/24 0946)   ketorolac injection 30 mg (30 mg Intravenous Given 6/29/24 0946)   ondansetron injection 8 mg (8 mg Intravenous Given 6/29/24 0946)   sodium chloride 0.9% bolus 1,000 mL 1,000 mL (0 mLs Intravenous Stopped 6/29/24 1153)   ibuprofen tablet 600 mg (600 mg Oral Given 6/29/24 1200)   sodium chloride 0.9% bolus 1,000 mL 1,000 mL (0 mLs Intravenous Stopped 6/29/24 1310)     Medical Decision Making  41-year-old with onset of vomiting and 5:00 a.m. this morning she also complains of headache she is actively vomiting does not complain of abdominal pain or diarrhea she has low-grade temperature she also complains of a headache  her laboratory work is unremarkable urine is clear white counts normal symptoms relieved in the ED because of a fever and dehydration she required 3 L of fluids but had a good blood pressure and pulse when she left    Amount and/or Complexity of Data Reviewed  Labs: ordered.    Risk  Prescription drug management.    She is return if her symptoms continue                                  Clinical Impression:  Final diagnoses:  [A08.4] Viral gastroenteritis (Primary)  [E86.0] Dehydration  [G44.209] Tension headache          ED Disposition Condition    Discharge Stable          ED Prescriptions       Medication Sig Dispense Start Date End Date Auth. Provider    butalbital-acetaminophen-caffeine -40 mg (FIORICET, ESGIC) -40 mg per tablet Take 1 tablet by mouth every 4 (four) hours as needed for Headaches. 18 tablet 6/29/2024 7/2/2024 BRIAN Bucio III, MD    ondansetron (ZOFRAN-ODT) 8 MG TbDL Take 1 tablet (8 mg total) by mouth every 8 (eight) hours as needed. 12 tablet 6/29/2024 7/3/2024 BRIAN Bucio III, MD          Follow-up Information    None          BRIAN Bucio III, MD  06/29/24 1325

## 2024-06-30 ENCOUNTER — HOSPITAL ENCOUNTER (EMERGENCY)
Facility: HOSPITAL | Age: 44
Discharge: HOME | End: 2024-06-30
Attending: FAMILY MEDICINE
Payer: MEDICAID

## 2024-06-30 ENCOUNTER — NURSE TRIAGE (OUTPATIENT)
Dept: ADMINISTRATIVE | Facility: CLINIC | Age: 44
End: 2024-06-30
Payer: MEDICAID

## 2024-06-30 VITALS
WEIGHT: 132.81 LBS | HEIGHT: 61 IN | BODY MASS INDEX: 25.07 KG/M2 | RESPIRATION RATE: 17 BRPM | OXYGEN SATURATION: 98 % | TEMPERATURE: 99 F | SYSTOLIC BLOOD PRESSURE: 109 MMHG | HEART RATE: 66 BPM | DIASTOLIC BLOOD PRESSURE: 67 MMHG

## 2024-06-30 DIAGNOSIS — K52.9 GASTROENTERITIS: Primary | ICD-10-CM

## 2024-06-30 DIAGNOSIS — R11.2 NAUSEA AND VOMITING, UNSPECIFIED VOMITING TYPE: ICD-10-CM

## 2024-06-30 LAB
ALBUMIN SERPL BCP-MCNC: 3.4 G/DL (ref 3.5–5.2)
ALP SERPL-CCNC: 85 U/L (ref 55–135)
ALT SERPL W/O P-5'-P-CCNC: 19 U/L (ref 10–44)
AMPHET+METHAMPHET UR QL: NEGATIVE
ANION GAP SERPL CALC-SCNC: 10 MMOL/L (ref 8–16)
AST SERPL-CCNC: 27 U/L (ref 10–40)
BARBITURATES UR QL SCN>200 NG/ML: ABNORMAL
BASOPHILS # BLD AUTO: 0.02 K/UL (ref 0–0.2)
BASOPHILS NFR BLD: 0.6 % (ref 0–1.9)
BENZODIAZ UR QL SCN>200 NG/ML: NEGATIVE
BILIRUB SERPL-MCNC: 2 MG/DL (ref 0.1–1)
BILIRUB UR QL STRIP: ABNORMAL
BUN SERPL-MCNC: 6 MG/DL (ref 6–20)
BZE UR QL SCN: NEGATIVE
CALCIUM SERPL-MCNC: 8.5 MG/DL (ref 8.7–10.5)
CANNABINOIDS UR QL SCN: NEGATIVE
CHLORIDE SERPL-SCNC: 110 MMOL/L (ref 95–110)
CLARITY UR: CLEAR
CO2 SERPL-SCNC: 17 MMOL/L (ref 23–29)
COLOR UR: ABNORMAL
CREAT SERPL-MCNC: 0.8 MG/DL (ref 0.5–1.4)
CREAT UR-MCNC: 169.2 MG/DL (ref 15–325)
DIFFERENTIAL METHOD BLD: ABNORMAL
EOSINOPHIL # BLD AUTO: 0.1 K/UL (ref 0–0.5)
EOSINOPHIL NFR BLD: 1.5 % (ref 0–8)
ERYTHROCYTE [DISTWIDTH] IN BLOOD BY AUTOMATED COUNT: 12.6 % (ref 11.5–14.5)
EST. GFR  (NO RACE VARIABLE): >60 ML/MIN/1.73 M^2
GLUCOSE SERPL-MCNC: 74 MG/DL (ref 70–110)
GLUCOSE UR QL STRIP: NEGATIVE
HCT VFR BLD AUTO: 34.6 % (ref 37–48.5)
HGB BLD-MCNC: 12.1 G/DL (ref 12–16)
HGB UR QL STRIP: ABNORMAL
IMM GRANULOCYTES # BLD AUTO: 0.01 K/UL (ref 0–0.04)
IMM GRANULOCYTES NFR BLD AUTO: 0.3 % (ref 0–0.5)
INFLUENZA A, MOLECULAR: NEGATIVE
INFLUENZA B, MOLECULAR: NEGATIVE
KETONES UR QL STRIP: ABNORMAL
LEUKOCYTE ESTERASE UR QL STRIP: NEGATIVE
LIPASE SERPL-CCNC: 24 U/L (ref 4–60)
LYMPHOCYTES # BLD AUTO: 0.7 K/UL (ref 1–4.8)
LYMPHOCYTES NFR BLD: 19.6 % (ref 18–48)
MCH RBC QN AUTO: 30.2 PG (ref 27–31)
MCHC RBC AUTO-ENTMCNC: 35 G/DL (ref 32–36)
MCV RBC AUTO: 86 FL (ref 82–98)
METHADONE UR QL SCN>300 NG/ML: NEGATIVE
MONOCYTES # BLD AUTO: 0.5 K/UL (ref 0.3–1)
MONOCYTES NFR BLD: 13.4 % (ref 4–15)
NEUTROPHILS # BLD AUTO: 2.2 K/UL (ref 1.8–7.7)
NEUTROPHILS NFR BLD: 64.6 % (ref 38–73)
NITRITE UR QL STRIP: NEGATIVE
NRBC BLD-RTO: 0 /100 WBC
OPIATES UR QL SCN: NEGATIVE
PCP UR QL SCN>25 NG/ML: NEGATIVE
PH UR STRIP: 6 [PH] (ref 5–8)
PLATELET # BLD AUTO: 178 K/UL (ref 150–450)
PMV BLD AUTO: 10.4 FL (ref 9.2–12.9)
POTASSIUM SERPL-SCNC: 3.6 MMOL/L (ref 3.5–5.1)
PROT SERPL-MCNC: 6.3 G/DL (ref 6–8.4)
PROT UR QL STRIP: NEGATIVE
RBC # BLD AUTO: 4.01 M/UL (ref 4–5.4)
SARS-COV-2 RDRP RESP QL NAA+PROBE: NEGATIVE
SODIUM SERPL-SCNC: 137 MMOL/L (ref 136–145)
SP GR UR STRIP: 1.02 (ref 1–1.03)
SPECIMEN SOURCE: NORMAL
TOXICOLOGY INFORMATION: ABNORMAL
URN SPEC COLLECT METH UR: ABNORMAL
UROBILINOGEN UR STRIP-ACNC: ABNORMAL EU/DL
WBC # BLD AUTO: 3.36 K/UL (ref 3.9–12.7)

## 2024-06-30 PROCEDURE — 99285 EMERGENCY DEPT VISIT HI MDM: CPT | Mod: 25

## 2024-06-30 PROCEDURE — 25000003 PHARM REV CODE 250: Performed by: NURSE PRACTITIONER

## 2024-06-30 PROCEDURE — 96361 HYDRATE IV INFUSION ADD-ON: CPT

## 2024-06-30 PROCEDURE — 85025 COMPLETE CBC W/AUTO DIFF WBC: CPT | Performed by: NURSE PRACTITIONER

## 2024-06-30 PROCEDURE — U0002 COVID-19 LAB TEST NON-CDC: HCPCS | Performed by: NURSE PRACTITIONER

## 2024-06-30 PROCEDURE — 80307 DRUG TEST PRSMV CHEM ANLYZR: CPT | Performed by: NURSE PRACTITIONER

## 2024-06-30 PROCEDURE — 83690 ASSAY OF LIPASE: CPT | Performed by: NURSE PRACTITIONER

## 2024-06-30 PROCEDURE — 96375 TX/PRO/DX INJ NEW DRUG ADDON: CPT

## 2024-06-30 PROCEDURE — 63600175 PHARM REV CODE 636 W HCPCS: Performed by: NURSE PRACTITIONER

## 2024-06-30 PROCEDURE — 96376 TX/PRO/DX INJ SAME DRUG ADON: CPT

## 2024-06-30 PROCEDURE — 81003 URINALYSIS AUTO W/O SCOPE: CPT | Performed by: NURSE PRACTITIONER

## 2024-06-30 PROCEDURE — 96365 THER/PROPH/DIAG IV INF INIT: CPT

## 2024-06-30 PROCEDURE — 87502 INFLUENZA DNA AMP PROBE: CPT | Performed by: NURSE PRACTITIONER

## 2024-06-30 PROCEDURE — 80053 COMPREHEN METABOLIC PANEL: CPT | Performed by: NURSE PRACTITIONER

## 2024-06-30 RX ORDER — SODIUM CHLORIDE 9 MG/ML
1000 INJECTION, SOLUTION INTRAVENOUS
Status: COMPLETED | OUTPATIENT
Start: 2024-06-30 | End: 2024-06-30

## 2024-06-30 RX ORDER — ONDANSETRON HYDROCHLORIDE 2 MG/ML
4 INJECTION, SOLUTION INTRAVENOUS
Status: COMPLETED | OUTPATIENT
Start: 2024-06-30 | End: 2024-06-30

## 2024-06-30 RX ORDER — PANTOPRAZOLE SODIUM 40 MG/1
40 TABLET, DELAYED RELEASE ORAL DAILY
Qty: 30 TABLET | Refills: 0 | Status: SHIPPED | OUTPATIENT
Start: 2024-06-30 | End: 2024-07-30

## 2024-06-30 RX ORDER — MORPHINE SULFATE 2 MG/ML
4 INJECTION, SOLUTION INTRAMUSCULAR; INTRAVENOUS
Status: COMPLETED | OUTPATIENT
Start: 2024-06-30 | End: 2024-06-30

## 2024-06-30 RX ORDER — DICYCLOMINE HYDROCHLORIDE 20 MG/1
20 TABLET ORAL 4 TIMES DAILY PRN
Qty: 15 TABLET | Refills: 0 | Status: SHIPPED | OUTPATIENT
Start: 2024-06-30 | End: 2024-07-30

## 2024-06-30 RX ORDER — MORPHINE SULFATE 2 MG/ML
2 INJECTION, SOLUTION INTRAMUSCULAR; INTRAVENOUS
Status: COMPLETED | OUTPATIENT
Start: 2024-06-30 | End: 2024-06-30

## 2024-06-30 RX ADMIN — MORPHINE SULFATE 2 MG: 2 INJECTION, SOLUTION INTRAMUSCULAR; INTRAVENOUS at 11:06

## 2024-06-30 RX ADMIN — SODIUM CHLORIDE 1000 ML: 9 INJECTION, SOLUTION INTRAVENOUS at 01:06

## 2024-06-30 RX ADMIN — PROMETHAZINE HYDROCHLORIDE 12.5 MG: 25 INJECTION INTRAMUSCULAR; INTRAVENOUS at 05:06

## 2024-06-30 RX ADMIN — MORPHINE SULFATE 4 MG: 2 INJECTION, SOLUTION INTRAMUSCULAR; INTRAVENOUS at 02:06

## 2024-06-30 RX ADMIN — SODIUM CHLORIDE 1000 ML: 9 INJECTION, SOLUTION INTRAVENOUS at 05:06

## 2024-06-30 RX ADMIN — ONDANSETRON 4 MG: 2 INJECTION INTRAMUSCULAR; INTRAVENOUS at 02:06

## 2024-06-30 RX ADMIN — SODIUM CHLORIDE 1000 ML: 9 INJECTION, SOLUTION INTRAVENOUS at 11:06

## 2024-06-30 RX ADMIN — ONDANSETRON 4 MG: 2 INJECTION INTRAMUSCULAR; INTRAVENOUS at 11:06

## 2024-06-30 NOTE — TELEPHONE ENCOUNTER
Was seen yesterday for body aches, headache, fever, and vomiting. Was given medication and headache is 10/10.  Reason for Disposition   Unable to walk, or can only walk with assistance (e.g., requires support)    Additional Information   Negative: Difficult to awaken or acting confused (e.g., disoriented, slurred speech)   Negative: [1] Weakness of the face, arm or leg on one side of the body AND [2] new-onset   Negative: [1] Numbness of the face, arm or leg on one side of the body AND [2] new-onset   Negative: [1] Loss of speech or garbled speech AND [2] new-onset   Negative: Passed out (e.g., fainted, lost consciousness, blacked out and was not responding)   Negative: Sounds like a life-threatening emergency to the triager   Negative: Followed a head injury   Negative: Traumatic Brain Injury (TBI) is suspected   Negative: Influenza suspected (i.e., cough, fever, other respiratory symptoms; probable influenza exposure)   Negative: Sinus pain or congestion is main symptom(s)   Negative: Pregnant   Negative: Postpartum (from 0 to 6 weeks after delivery)   Negative: Spinal cord injury    Protocols used: Headache-A-AH

## 2024-06-30 NOTE — ED PROVIDER NOTES
Encounter Date: 6/30/2024       History     Chief Complaint   Patient presents with    Abdominal Pain    Vomiting    Headache     History of Present Illness  Darren Eoduard is a 43 y.o. female with PMH of depression presents to the ED   for evaluation of abdominal pain and vomiting.  Patient reports that symptoms   initially developed yesterday with generalized body aches, nausea and vomiting,   and lower abdominal pain.  She was evaluated in the ED with a negative workup   and felt better after receiving IV fluids.  She reports that symptoms return yesterday   PM and she is feeling worse today. She notes multiple episodes of bilious, nonbloody   emesis, generalized headache, and right-sided abdominal pain. Pain is constant,   currently rated 6/10 in severity.  She denies alleviating or exacerbating factors. Denies   associated loose stool, UTI symptoms, flank pain, or hematuria. She reports feeling   feverish. No sick contacts at home.  Patient was stable vital signs on ER triage    The history is provided by the patient.     Review of patient's allergies indicates:   Allergen Reactions    Iodinated contrast media      Past Medical History:   Diagnosis Date    Depression      History reviewed. No pertinent surgical history.  No family history on file.  Social History     Tobacco Use    Smoking status: Never   Substance Use Topics    Alcohol use: Yes     Comment: occ    Drug use: Never     Review of Systems   Constitutional:  Positive for fever. Negative for activity change and chills.   HENT:  Negative for congestion, ear discharge, ear pain, postnasal drip, sinus pressure, sinus pain and sore throat.    Respiratory:  Negative for cough, chest tightness and shortness of breath.    Cardiovascular:  Negative for chest pain.   Gastrointestinal:  Positive for abdominal pain, nausea and vomiting. Negative for abdominal distention.   Genitourinary:  Negative for dysuria, frequency and urgency.   Musculoskeletal:   Negative for back pain.   Skin: Negative.  Negative for rash.   Neurological:  Negative for dizziness, weakness, light-headedness and numbness.   Hematological:  Does not bruise/bleed easily.       Physical Exam     Initial Vitals [06/30/24 1106]   BP Pulse Resp Temp SpO2   (!) 101/50 80 18 99 °F (37.2 °C) 97 %      MAP       --         Physical Exam    Nursing note and vitals reviewed.  Constitutional: She appears well-developed and well-nourished.   HENT:   Head: Normocephalic and atraumatic.   Eyes: Conjunctivae and EOM are normal. Pupils are equal, round, and reactive to light.   Neck: Neck supple.   Cardiovascular:  Normal rate, regular rhythm, normal heart sounds and intact distal pulses.           Pulmonary/Chest: Breath sounds normal.   Abdominal: Abdomen is soft. Bowel sounds are normal. There is abdominal tenderness in the right lower quadrant and periumbilical area. There is guarding.   Musculoskeletal:         General: Normal range of motion.      Cervical back: Neck supple.     Neurological: She is alert and oriented to person, place, and time. She has normal strength. No cranial nerve deficit or sensory deficit. GCS eye subscore is 4. GCS verbal subscore is 5. GCS motor subscore is 6.   Alert. Though processes coherent. AAO X 3. CN's intact. Good muscle bulk and tone. Strength 5/5 throughout. Cerebellar: F-N intact, H-S intact.    Skin: Skin is warm and dry. Capillary refill takes less than 2 seconds.   Psychiatric: She has a normal mood and affect. Her behavior is normal. Judgment and thought content normal.         ED Course   Procedures  Labs Reviewed   CBC W/ AUTO DIFFERENTIAL - Abnormal; Notable for the following components:       Result Value    WBC 3.36 (*)     Hematocrit 34.6 (*)     Lymph # 0.7 (*)     All other components within normal limits   COMPREHENSIVE METABOLIC PANEL - Abnormal; Notable for the following components:    CO2 17 (*)     Calcium 8.5 (*)     Albumin 3.4 (*)     Total  Bilirubin 2.0 (*)     All other components within normal limits   URINALYSIS, REFLEX TO URINE CULTURE - Abnormal; Notable for the following components:    Ketones, UA 3+ (*)     Bilirubin (UA) 2+ (*)     Occult Blood UA Trace (*)     Urobilinogen, UA 2.0-3.0 (*)     All other components within normal limits    Narrative:     Specimen Source->Urine   DRUG SCREEN PANEL, URINE EMERGENCY - Abnormal; Notable for the following components:    Barbiturate Screen, Ur Presumptive Positive (*)     All other components within normal limits    Narrative:     Specimen Source->Urine   INFLUENZA A & B BY MOLECULAR   LIPASE   SARS-COV-2 RNA AMPLIFICATION, QUAL          Imaging Results              US Abdomen Limited (Final result)  Result time 06/30/24 17:02:34      Final result by Fabrice Ellsworth DO (06/30/24 17:02:34)                   Impression:      No cholelithiasis or acute cholecystitis.    2 mm nonobstructing right renal calculus.      Electronically signed by: Fabrice Ellsworth  Date:    06/30/2024  Time:    17:02               Narrative:    EXAMINATION:  US ABDOMEN LIMITED    CLINICAL HISTORY:  RUQ abdominal pain;    TECHNIQUE:  Limited ultrasound of the right upper quadrant of the abdomen (including pancreas, liver, gallbladder, common bile duct, and spleen) was performed.    COMPARISON:  None.    FINDINGS:  Liver: Normal in size, measuring 15.3 cm. Homogeneous echotexture. No focal hepatic lesions.    Gallbladder: No calculi, wall thickening, or pericholecystic fluid.  No sonographic Webb's sign.    Biliary system: The common duct is not dilated, measuring 4 mm.  No intrahepatic ductal dilatation.    Spleen: Normal in size and echotexture, measuring 11.0 x 2.9 cm.    Pancreas: Partially visualized and unremarkable.    IVC: Unremarkable.    Right kidney: The kidney measures 11.7 cm.  There is a 2 mm nonobstructing renal calculus.  No hydronephrosis.    Miscellaneous: No upper abdominal ascites.                                        CT Head Without Contrast (Final result)  Result time 06/30/24 14:40:42      Final result by Rosa Grimes MD (06/30/24 14:40:42)                   Impression:      No acute intracranial      Electronically signed by: Rosa Grimes MD  Date:    06/30/2024  Time:    14:40               Narrative:    EXAMINATION:  CT HEAD WITHOUT CONTRAST    CLINICAL HISTORY:  Headache, sudden, severe;    TECHNIQUE:  Low dose axial images were obtained through the head.  Coronal and sagittal reformations were also performed. Contrast was not administered.    COMPARISON:  07/03/2023    FINDINGS:  Ventricles, sulci, fissure, white matter are unremarkable in appearance for the patient's age. There is no acute intracranial hemorrhage.  There is no intracranial mass effect.  There is no acute major vascular territory infarct. Note is made that MRI is typically more sensitive than CT particular for detection of early or small nonhemorrhagic infarct. The calvarium appears intact, mastoids well pneumatized, visualized paranasal sinuses essentially clear.                                       CT Abdomen Pelvis  Without Contrast (Final result)  Result time 06/30/24 13:28:41      Final result by Rosa Grimes MD (06/30/24 13:28:41)                   Impression:      There is a mildly prominent amount of stool within the colon.  There is a small amount of free fluid the pelvis which is nonspecific but not more so than can be seen on a physiologic basis.  Diffuse fatty infiltration of the liver.  Faintly opaque stones and or sludge suggested layering in the dependent portion the gallbladder without CT findings of acute cholecystitis    Findings as detailed above.      Electronically signed by: Rosa Grimes MD  Date:    06/30/2024  Time:    13:28               Narrative:    EXAMINATION:  CT ABDOMEN PELVIS WITHOUT CONTRAST    CLINICAL HISTORY:  Abdominal pain, acute, nonlocalized;    TECHNIQUE:  Low dose axial images,  sagittal and coronal reformations were obtained from the lung bases to the pubic symphysis.  No p.o. contrast    COMPARISON:  02/17/2020    FINDINGS:  Mild hypoventilatory changes in visualized dependent lung bases    Liver;   diffuse fatty infiltration    Gallbladder and bile ducts; faintly opaque stones or sludge suggested layering in the gallbladder without CT findings of acute cholecystitis or biliary duct dilatation.  Gallbladder/gallstones typically better evaluated with ultrasound which could be obtained if indicated clinically.    Spleen; not enlarged    Adrenal glands; unremarkable    Pancreas; unremarkable    Abdominal aorta;    no aneurysm    Stomach, bowel, mesentery; there is a mildly prominent amount of stool within segments of colon.  Normal appearing appendix is suggested and no abnormal appendix inflammatory changes appendiceal region is seen.  There is a small amount of free fluid the pelvis nonspecific but not more so than can be seen on a physiologic basis.  No appreciable bowel wall thickening or inflammatory change    Kidneys, ureters, bladder; no hydronephrosis opaque renal stone.  No ureteral dilatation, opaque ureteral stone or ureteral obstruction.  Urinary bladder mildly distended at time of the exam and as visualized unremarkable appearance    Reproductive organs; typically better evaluate by ultrasound which could be obtained if indicated clinically.  As visualized uterus and adnexal region unremarkable appearance.  Small right adnexal calcification not significantly changed    Osseous structures; degenerative changes.  SI joint sclerosis.  No obvious aggressive appearing osseous lesion                                       Medications   0.9%  NaCl infusion (0 mLs Intravenous Stopped 6/30/24 1249)   ondansetron injection 4 mg (4 mg Intravenous Given 6/30/24 1151)   morphine injection 2 mg (2 mg Intravenous Given 6/30/24 1151)   0.9%  NaCl infusion (0 mLs Intravenous Stopped 6/30/24 1455)    ondansetron injection 4 mg (4 mg Intravenous Given 6/30/24 1430)   morphine injection 4 mg (4 mg Intravenous Given 6/30/24 1433)   promethazine (PHENERGAN) 12.5 mg in 0.9% NaCl 50 mL IVPB (0 mg Intravenous Stopped 6/30/24 1848)   0.9%  NaCl infusion (1,000 mLs Intravenous New Bag 6/30/24 1739)     Medical Decision Making  Evaluation of a 43-year-old female with generalized headache, lower abdominal pain, and vomiting past 2 days.  She presents actively retching. Physical exam with periumbilical TTP. No focal deficits on exam.     Lab workup including CBC, CMP, Lipase, UA     Amount and/or Complexity of Data Reviewed  Labs: ordered. Decision-making details documented in ED Course.  Radiology: ordered. Decision-making details documented in ED Course.    Risk  Prescription drug management.  Risk Details: Presented with headache, abdominal pain, and vomiting. Lab workup with no leukocytosis, CMP with C02 of 17 and T-bili of 2.0. UA with 3+ ketones. UDS + barbiturates. CTAP shows mild prominent stool in colon. Diffuse fatty infiltration of the liver.  Faintly opaque stones and or sludge suggested layering in the dependent portion the gallbladder without CT findings of acute cholecystitis. CT head negative for acute findings. Given elevated Bili- Ultrasound of GB completed that showed no ductal dilatation.   She received 2 L of IVFs in addition to Zofran and Phenergan but continues to vomit. She was also given Morphine which did relieve both her headache and abdominal pain. She failed PO fluid challenge. IVFs initiated at 125 ml/hr and will continue to monitor.       Additional MDM:   Differential Diagnosis:   Symptom: Abdominal pain. <> The follow diagnoses were considered and will be evaluated: Appendicitis, Bowel Obstruction, Cholecystitis, Cholelithiasis, Colitis, Cystitis, Diverticulitis, Gastritis, Gastroenteritis, Gastroparesis, Peritonitis, Renal Stone and Urinary Tract Infection.      ED Management & Risks of  Complication, Morbidity, & Mortality:  I am finishing note on this patient from the nurse practitioner during the daytime shift  Patient was sleeping at the time that the nurse practitioner left the ER at 8:30 p.m.  Nurse practitioner ordered an ultrasound gallbladder was (-), (-) head CT today  Stable lab work, (-) swabs, (-) pregnancy test, (-) urinalysis in the ER this evening  CT scan of the abdomen pelvis showed a nonacute abdomen per report tonight  Pain control, IV fluids, patient feeling 100% better on 9:30 p.m. reassessment  Patient was seen in the ER yesterday same symptoms, Zofran & Fioricet Rx  On reassessment now, patient was no headache, no abdominal pain, no emesis  I have counseled the patient on a very light diet going forward with close follow-up  Also prescribe Protonix & Bentyl for gastrointestinal symptoms on ER discharge  Pt/Family counseled to return to the ER with any concerning symptoms     Clinical Impression:  Final diagnoses:  [K52.9] Gastroenteritis (Primary)  [R11.2] Nausea and vomiting, unspecified vomiting type          ED Disposition Condition    Discharge Stable          ED Prescriptions       Medication Sig Dispense Start Date End Date Auth. Provider    pantoprazole (PROTONIX) 40 MG tablet Take 1 tablet (40 mg total) by mouth once daily. 30 tablet 6/30/2024 7/30/2024 Keegan Lee MD    dicyclomine (BENTYL) 20 mg tablet Take 1 tablet (20 mg total) by mouth 4 (four) times daily as needed (CRAMPS). 15 tablet 6/30/2024 7/30/2024 Keegan Lee MD          Follow-up Information       Follow up With Specialties Details Why Contact Info    Jonathan Isaacs MD Family Medicine Go in 2 days  [address] [facility] [address]  [phone]               Keegan Lee MD  06/30/24 7318

## 2024-07-31 ENCOUNTER — HOSPITAL ENCOUNTER (EMERGENCY)
Facility: HOSPITAL | Age: 44
Discharge: HOME | End: 2024-07-31
Attending: STUDENT IN AN ORGANIZED HEALTH CARE EDUCATION/TRAINING PROGRAM
Payer: MEDICAID

## 2024-07-31 VITALS
OXYGEN SATURATION: 100 % | HEART RATE: 60 BPM | WEIGHT: 128.63 LBS | RESPIRATION RATE: 18 BRPM | SYSTOLIC BLOOD PRESSURE: 99 MMHG | BODY MASS INDEX: 24.29 KG/M2 | TEMPERATURE: 98 F | HEIGHT: 61 IN | DIASTOLIC BLOOD PRESSURE: 55 MMHG

## 2024-07-31 DIAGNOSIS — B34.9 VIRAL SYNDROME: Primary | ICD-10-CM

## 2024-07-31 LAB
ALBUMIN SERPL BCP-MCNC: 3.7 G/DL (ref 3.5–5.2)
ALP SERPL-CCNC: 71 U/L (ref 55–135)
ALT SERPL W/O P-5'-P-CCNC: 28 U/L (ref 10–44)
ANION GAP SERPL CALC-SCNC: 8 MMOL/L (ref 8–16)
AST SERPL-CCNC: 23 U/L (ref 10–40)
B-HCG UR QL: NEGATIVE
BASOPHILS # BLD AUTO: 0.02 K/UL (ref 0–0.2)
BASOPHILS NFR BLD: 0.3 % (ref 0–1.9)
BILIRUB SERPL-MCNC: 0.5 MG/DL (ref 0.1–1)
BILIRUB UR QL STRIP: ABNORMAL
BUN SERPL-MCNC: 10 MG/DL (ref 6–20)
CALCIUM SERPL-MCNC: 8.6 MG/DL (ref 8.7–10.5)
CHLORIDE SERPL-SCNC: 108 MMOL/L (ref 95–110)
CLARITY UR: CLEAR
CO2 SERPL-SCNC: 22 MMOL/L (ref 23–29)
COLOR UR: YELLOW
CREAT SERPL-MCNC: 0.8 MG/DL (ref 0.5–1.4)
DIFFERENTIAL METHOD BLD: ABNORMAL
EOSINOPHIL # BLD AUTO: 0.1 K/UL (ref 0–0.5)
EOSINOPHIL NFR BLD: 0.8 % (ref 0–8)
ERYTHROCYTE [DISTWIDTH] IN BLOOD BY AUTOMATED COUNT: 13 % (ref 11.5–14.5)
EST. GFR  (NO RACE VARIABLE): >60 ML/MIN/1.73 M^2
GLUCOSE SERPL-MCNC: 106 MG/DL (ref 70–110)
GLUCOSE UR QL STRIP: NEGATIVE
GROUP A STREP, MOLECULAR: NEGATIVE
HCT VFR BLD AUTO: 35.5 % (ref 37–48.5)
HGB BLD-MCNC: 12.1 G/DL (ref 12–16)
HGB UR QL STRIP: NEGATIVE
IMM GRANULOCYTES # BLD AUTO: 0.03 K/UL (ref 0–0.04)
IMM GRANULOCYTES NFR BLD AUTO: 0.5 % (ref 0–0.5)
INFLUENZA A, MOLECULAR: NEGATIVE
INFLUENZA B, MOLECULAR: NEGATIVE
KETONES UR QL STRIP: ABNORMAL
LACTATE SERPL-SCNC: 0.7 MMOL/L (ref 0.5–2.2)
LEUKOCYTE ESTERASE UR QL STRIP: NEGATIVE
LIPASE SERPL-CCNC: 40 U/L (ref 4–60)
LYMPHOCYTES # BLD AUTO: 0.6 K/UL (ref 1–4.8)
LYMPHOCYTES NFR BLD: 8.8 % (ref 18–48)
MCH RBC QN AUTO: 29.8 PG (ref 27–31)
MCHC RBC AUTO-ENTMCNC: 34.1 G/DL (ref 32–36)
MCV RBC AUTO: 87 FL (ref 82–98)
MONOCYTES # BLD AUTO: 0.4 K/UL (ref 0.3–1)
MONOCYTES NFR BLD: 6.1 % (ref 4–15)
NEUTROPHILS # BLD AUTO: 5.2 K/UL (ref 1.8–7.7)
NEUTROPHILS NFR BLD: 83.5 % (ref 38–73)
NITRITE UR QL STRIP: NEGATIVE
NRBC BLD-RTO: 0 /100 WBC
PH UR STRIP: 6 [PH] (ref 5–8)
PLATELET # BLD AUTO: 167 K/UL (ref 150–450)
PMV BLD AUTO: 10.6 FL (ref 9.2–12.9)
POTASSIUM SERPL-SCNC: 4.2 MMOL/L (ref 3.5–5.1)
PROT SERPL-MCNC: 6.7 G/DL (ref 6–8.4)
PROT UR QL STRIP: NEGATIVE
RBC # BLD AUTO: 4.06 M/UL (ref 4–5.4)
SARS-COV-2 RDRP RESP QL NAA+PROBE: NEGATIVE
SODIUM SERPL-SCNC: 138 MMOL/L (ref 136–145)
SP GR UR STRIP: 1.02 (ref 1–1.03)
SPECIMEN SOURCE: NORMAL
URN SPEC COLLECT METH UR: ABNORMAL
UROBILINOGEN UR STRIP-ACNC: 1 EU/DL
WBC # BLD AUTO: 6.22 K/UL (ref 3.9–12.7)

## 2024-07-31 PROCEDURE — 99284 EMERGENCY DEPT VISIT MOD MDM: CPT

## 2024-07-31 PROCEDURE — 25000003 PHARM REV CODE 250: Performed by: STUDENT IN AN ORGANIZED HEALTH CARE EDUCATION/TRAINING PROGRAM

## 2024-07-31 PROCEDURE — 81025 URINE PREGNANCY TEST: CPT | Performed by: STUDENT IN AN ORGANIZED HEALTH CARE EDUCATION/TRAINING PROGRAM

## 2024-07-31 PROCEDURE — 87502 INFLUENZA DNA AMP PROBE: CPT | Performed by: STUDENT IN AN ORGANIZED HEALTH CARE EDUCATION/TRAINING PROGRAM

## 2024-07-31 PROCEDURE — 85025 COMPLETE CBC W/AUTO DIFF WBC: CPT | Performed by: STUDENT IN AN ORGANIZED HEALTH CARE EDUCATION/TRAINING PROGRAM

## 2024-07-31 PROCEDURE — 87651 STREP A DNA AMP PROBE: CPT | Performed by: STUDENT IN AN ORGANIZED HEALTH CARE EDUCATION/TRAINING PROGRAM

## 2024-07-31 PROCEDURE — 81003 URINALYSIS AUTO W/O SCOPE: CPT | Performed by: STUDENT IN AN ORGANIZED HEALTH CARE EDUCATION/TRAINING PROGRAM

## 2024-07-31 PROCEDURE — U0002 COVID-19 LAB TEST NON-CDC: HCPCS | Performed by: STUDENT IN AN ORGANIZED HEALTH CARE EDUCATION/TRAINING PROGRAM

## 2024-07-31 PROCEDURE — 83605 ASSAY OF LACTIC ACID: CPT | Performed by: STUDENT IN AN ORGANIZED HEALTH CARE EDUCATION/TRAINING PROGRAM

## 2024-07-31 PROCEDURE — 80053 COMPREHEN METABOLIC PANEL: CPT | Performed by: STUDENT IN AN ORGANIZED HEALTH CARE EDUCATION/TRAINING PROGRAM

## 2024-07-31 PROCEDURE — 83690 ASSAY OF LIPASE: CPT | Performed by: STUDENT IN AN ORGANIZED HEALTH CARE EDUCATION/TRAINING PROGRAM

## 2024-07-31 RX ORDER — ISONIAZID 100 MG/1
300 TABLET ORAL DAILY
COMMUNITY

## 2024-07-31 RX ADMIN — SODIUM CHLORIDE 500 ML: 9 INJECTION, SOLUTION INTRAVENOUS at 10:07

## 2024-08-28 ENCOUNTER — HOSPITAL ENCOUNTER (EMERGENCY)
Facility: HOSPITAL | Age: 44
Discharge: HOME | End: 2024-08-29
Attending: STUDENT IN AN ORGANIZED HEALTH CARE EDUCATION/TRAINING PROGRAM
Payer: MEDICAID

## 2024-08-28 DIAGNOSIS — M54.12 CERVICAL RADICULOPATHY: Primary | ICD-10-CM

## 2024-08-28 LAB — B-HCG UR QL: NEGATIVE

## 2024-08-28 PROCEDURE — 81025 URINE PREGNANCY TEST: CPT | Performed by: STUDENT IN AN ORGANIZED HEALTH CARE EDUCATION/TRAINING PROGRAM

## 2024-08-28 PROCEDURE — 25000003 PHARM REV CODE 250: Performed by: STUDENT IN AN ORGANIZED HEALTH CARE EDUCATION/TRAINING PROGRAM

## 2024-08-28 PROCEDURE — 99285 EMERGENCY DEPT VISIT HI MDM: CPT | Mod: 25

## 2024-08-28 PROCEDURE — 96372 THER/PROPH/DIAG INJ SC/IM: CPT | Performed by: STUDENT IN AN ORGANIZED HEALTH CARE EDUCATION/TRAINING PROGRAM

## 2024-08-28 PROCEDURE — 63600175 PHARM REV CODE 636 W HCPCS: Performed by: STUDENT IN AN ORGANIZED HEALTH CARE EDUCATION/TRAINING PROGRAM

## 2024-08-28 RX ORDER — KETOROLAC TROMETHAMINE 30 MG/ML
15 INJECTION, SOLUTION INTRAMUSCULAR; INTRAVENOUS
Status: COMPLETED | OUTPATIENT
Start: 2024-08-28 | End: 2024-08-28

## 2024-08-28 RX ORDER — LIDOCAINE HYDROCHLORIDE 10 MG/ML
5 INJECTION, SOLUTION EPIDURAL; INFILTRATION; INTRACAUDAL; PERINEURAL
Status: COMPLETED | OUTPATIENT
Start: 2024-08-28 | End: 2024-08-28

## 2024-08-28 RX ADMIN — KETOROLAC TROMETHAMINE 15 MG: 30 INJECTION, SOLUTION INTRAMUSCULAR at 11:08

## 2024-08-28 RX ADMIN — LIDOCAINE HYDROCHLORIDE 50 MG: 10 INJECTION, SOLUTION EPIDURAL; INFILTRATION; INTRACAUDAL; PERINEURAL at 11:08

## 2024-08-29 ENCOUNTER — HOSPITAL ENCOUNTER (EMERGENCY)
Facility: HOSPITAL | Age: 44
Discharge: HOME | End: 2024-08-29
Attending: EMERGENCY MEDICINE

## 2024-08-29 ENCOUNTER — NURSE TRIAGE (OUTPATIENT)
Dept: ADMINISTRATIVE | Facility: CLINIC | Age: 44
End: 2024-08-29
Payer: MEDICAID

## 2024-08-29 VITALS
BODY MASS INDEX: 24.8 KG/M2 | DIASTOLIC BLOOD PRESSURE: 63 MMHG | HEART RATE: 66 BPM | SYSTOLIC BLOOD PRESSURE: 115 MMHG | HEART RATE: 70 BPM | DIASTOLIC BLOOD PRESSURE: 56 MMHG | HEIGHT: 61 IN | RESPIRATION RATE: 16 BRPM | HEIGHT: 61 IN | OXYGEN SATURATION: 98 % | OXYGEN SATURATION: 95 % | TEMPERATURE: 98 F | RESPIRATION RATE: 20 BRPM | WEIGHT: 130.75 LBS | WEIGHT: 131.38 LBS | TEMPERATURE: 98 F | SYSTOLIC BLOOD PRESSURE: 131 MMHG | BODY MASS INDEX: 24.69 KG/M2

## 2024-08-29 DIAGNOSIS — M54.10 RADICULOPATHY, UNSPECIFIED SPINAL REGION: Primary | ICD-10-CM

## 2024-08-29 PROCEDURE — 63600175 PHARM REV CODE 636 W HCPCS: Performed by: EMERGENCY MEDICINE

## 2024-08-29 PROCEDURE — 96372 THER/PROPH/DIAG INJ SC/IM: CPT | Performed by: EMERGENCY MEDICINE

## 2024-08-29 PROCEDURE — 99284 EMERGENCY DEPT VISIT MOD MDM: CPT | Mod: 25

## 2024-08-29 RX ORDER — ORPHENADRINE CITRATE 100 MG/1
100 TABLET, EXTENDED RELEASE ORAL 2 TIMES DAILY PRN
Qty: 20 TABLET | Refills: 0 | Status: SHIPPED | OUTPATIENT
Start: 2024-08-29 | End: 2024-09-08

## 2024-08-29 RX ORDER — IBUPROFEN 400 MG/1
400 TABLET ORAL EVERY 6 HOURS PRN
Qty: 30 TABLET | Refills: 0 | Status: SHIPPED | OUTPATIENT
Start: 2024-08-29 | End: 2024-09-12

## 2024-08-29 RX ORDER — CYCLOBENZAPRINE HCL 10 MG
10 TABLET ORAL 3 TIMES DAILY PRN
Qty: 15 TABLET | Refills: 0 | Status: SHIPPED | OUTPATIENT
Start: 2024-08-29 | End: 2024-09-03

## 2024-08-29 RX ORDER — ORPHENADRINE CITRATE 30 MG/ML
60 INJECTION INTRAMUSCULAR; INTRAVENOUS
Status: COMPLETED | OUTPATIENT
Start: 2024-08-29 | End: 2024-08-29

## 2024-08-29 RX ORDER — DEXAMETHASONE SODIUM PHOSPHATE 4 MG/ML
12 INJECTION, SOLUTION INTRA-ARTICULAR; INTRALESIONAL; INTRAMUSCULAR; INTRAVENOUS; SOFT TISSUE
Status: COMPLETED | OUTPATIENT
Start: 2024-08-29 | End: 2024-08-29

## 2024-08-29 RX ADMIN — ORPHENADRINE CITRATE 60 MG: 60 INJECTION INTRAMUSCULAR; INTRAVENOUS at 01:08

## 2024-08-29 RX ADMIN — DEXAMETHASONE SODIUM PHOSPHATE 12 MG: 4 INJECTION, SOLUTION INTRA-ARTICULAR; INTRALESIONAL; INTRAMUSCULAR; INTRAVENOUS; SOFT TISSUE at 01:08

## 2024-08-29 NOTE — ED PROVIDER NOTES
Encounter Date: 8/28/2024       History     Chief Complaint   Patient presents with    Neck Pain     Pt with c/o neck and shoulder pain that began on Monday; reports began with right hand pain and numbness to first 4 fingers. Reports currently under great amount of stress.      43-year-old female with history of contrast allergy presenting with neck and shoulder pain that began three days ago.  Patient reports that her trapezius muscles both feel very tight, and that she was having a tingling sensation to her lateral three digits on both hands.  Denies any trauma.  Patient reports that she has been very stressed at work.  No other complaints.      Review of patient's allergies indicates:   Allergen Reactions    Iodinated contrast media      Past Medical History:   Diagnosis Date    Depression      History reviewed. No pertinent surgical history.  No family history on file.  Social History     Tobacco Use    Smoking status: Never   Substance Use Topics    Alcohol use: Yes     Comment: occ    Drug use: Never     Review of Systems   Constitutional:  Negative for fever.   HENT:  Negative for sore throat.    Respiratory:  Negative for shortness of breath.    Cardiovascular:  Negative for chest pain.   Gastrointestinal:  Negative for nausea.   Genitourinary:  Negative for dysuria.   Musculoskeletal:  Positive for neck pain. Negative for back pain.   Skin:  Negative for rash.   Neurological:  Negative for weakness.        Paresthesias bilateral hands   Hematological:  Does not bruise/bleed easily.       Physical Exam     Initial Vitals [08/28/24 2316]   BP Pulse Resp Temp SpO2   131/63 66 16 97.9 °F (36.6 °C) 95 %      MAP       --         Physical Exam    Nursing note and vitals reviewed.  Constitutional: She appears well-developed.   HENT:   Head: Normocephalic.   Eyes: Pupils are equal, round, and reactive to light.   Neck:   Normal range of motion.  Cardiovascular:            No murmur heard.  Pulmonary/Chest: No  respiratory distress.   Abdominal: Abdomen is soft.   Musculoskeletal:         General: No edema.      Cervical back: Normal range of motion.      Comments: No midline cervical tenderness to palpation.  There was bilateral trapezius tenderness to palpation.     Neurological: She is alert.   Sensation fully intact throughout bilateral upper extremities.  Strength 5/5 in bilateral upper extremities.   Skin: Skin is warm.   Psychiatric: She has a normal mood and affect.         ED Course   Procedures  Labs Reviewed   PREGNANCY TEST, URINE RAPID       Result Value    Preg Test, Ur Negative      Narrative:     Specimen Source->Urine          Imaging Results    None          Medications   LIDOcaine (PF) 10 mg/ml (1%) injection 50 mg (has no administration in time range)   ketorolac injection 15 mg (has no administration in time range)     Medical Decision Making  DDX:  Suspect bilateral cervical radiculopathy.  Will perform trigger point injections and give anti-inflammatory.  Given bilateral nature, will image to assess for severe disc disease.  DX:  CT cervical spine  TX:  Trigger point injections, Toradol  Dispo:  Pending workup        Amount and/or Complexity of Data Reviewed  Radiology: ordered.    Risk  Prescription drug management.                                      Clinical Impression:  Final diagnoses:  [M54.12] Cervical radiculopathy (Primary)                 Jesus Snyder MD  08/28/24 6603

## 2024-08-29 NOTE — TELEPHONE ENCOUNTER
"Pt calls and states that she was seen in ED last night and prescribed a muscle relaxer for cervical radiculopathy. D/t insurance problem, pt is unable to get prescription at this time. She reports numbness and pain in both hands bilaterally today. Started with just her right hand yesterday. She also "feels a knot in neck area." Pt is unsure if she has a fever at this time, but "feels hot." She also reports swelling to her hands. She states that the pain worsens when trying to grab something.     Care Advice recommends ED/UCC now for re-evaluation. Pt verbalizes understanding and is instructed to call back with any new/worsening sxs, questions, or concerns.   Reason for Disposition   Swollen joint and fever     Subjective fever    Additional Information   Negative: Similar pain previously and it was from 'heart attack'   Negative: Similar pain previously from 'angina' and not relieved by nitroglycerin   Negative: Sounds like a life-threatening emergency to the triager   Negative: Fever and red area (or area very tender to touch)    Protocols used: Hand and Wrist Pain-A-[place]    "

## 2024-08-29 NOTE — ED PROVIDER NOTES
[facility]                                                  Chief Complaint  43 y.o. female with Numbness    History of Present Illness  Darren Edouard presents to the emergency room with complaints of persistent radiculopathy which she was diagnosed with yesterday from Dr. Snyder.  Patient states that she has some pain and numbness going down both arms and into her fingers.  CT cervical spine yesterday was within normal limits.  She was given trigger point injections and a Toradol shot.  Patient states that pain is still there because she was unable to feel the muscle relaxer.  When she called the nurse ask what she should do she was told to be evaluated in the emergency room.  Patient stated that she really did not want to come but she came anyways.  She has no new trauma or new pathology.    Past Medical History:   Diagnosis Date    Depression      History reviewed. No pertinent surgical history.   Review of patient's allergies indicates:   Allergen Reactions    Iodinated contrast media         Review of Systems and Physical Exam     Review of Systems  -- Constitution - no fever, no weight loss, no loss of consciousness  -- Eyes - no changes in vision, no redness, no swelling  -- Ear, Nose - no  earache, denies congestion  -- Mouth,Throat - no sore throat, no toothache, normal voice, normal swallowing  -- Respiratory - denies cough and congestion, no shortness of breath, no wheezing  -- Cardiovascular - denies chest pain, no palpitations,   -- Gastrointestinal - denies abdominal pain, denies nausea, vomiting, and diarrhea  -- Genitourinary - no dysuria, no denies flank pain, no hematuria or frequency   -- Musculoskeletal - denies back pain, negative for myalgias and arthralgias reports neck pain and numbness radiating down both arms  -- Neurological - no headache, no neurologic changes, no loss of bladder or bowel function no seizure like activity, no changes in hearing or vision  -- Skin -  "denies skin changes, no rash, no hives, no suspected skin infection    Vital Signs   height is 5' 1" (1.549 m) and weight is 59.3 kg (130 lb 11.7 oz). Her temperature is 98.4 °F (36.9 °C). Her blood pressure is 115/56 (abnormal) and her pulse is 70. Her respiration is 20 and oxygen saturation is 98%.      Physical Exam  -- Nursing note and vitals reviewed  -- Constitutional:  Awake alert and oriented, GCS 15, no acute distress.  Appears well.  -- Head: Atraumatic. Normocephalic. No obvious abnormality  -- Eyes: Pupils are equal and reactive to light. Extraocular movements intact. No nystagmus.  No periorbital swelling. Normal conjunctiva.  -- Nose: Nose grossly normal in appearance, nares grossly normal. No rhinorrhea.  -- Throat: Mucous membranes moist, pharynx normal, normal tonsils.  Airway patent.  -- Ears: External ears and TM normal bilaterally. Normal hearing.   -- Neck: Normal range of motion. Neck supple. No meningismus. No adenopathy  -- Cardiac: Normal rate, regular rhythm and normal heart sounds. No carotid bruit. No lower extremity edema.  -- Pulmonary: Normal respiratory effort, breath sounds equal bilaterally. Adequate flow.  No wheezing.  No crackles.  -- Abdominal: Soft, no tenderness, no guarding, no rebound. Normal bowel sounds.   -- Musculoskeletal: Normal range of motion, all 4 extremities 5/5 strength.  Neurovascularly intact. Atraumatic. No deformities.  Tenderness to bilateral trapezius.  Upper extremities with normal range of motion and neurovascularly intact  -- Neurological:  Cranial nerves 2-12 grossly intact. No focal deficits.   -- Vascular: Posterior tibial, dorsalis pedis and radial pulses 2+ bilaterally    -- Lymphatics: No cervical or peripheral lymphadenopathy.   -- Skin: Warm and dry. No evidence of rash or cellulitis  -- Psychiatric: Normal mood and affect. Bedside behavior is appropriate.  Patient is cooperative.  Denies suicidal homicidal ideation.    Emergency Room Course "     Treatment Course, Evaluation, and Medical Decision Making  1. Physical exam consistent with previous diagnosis of radiculopathy  2. Dexamethasone 12 IM  3. Orphenadrine 60 IM  4. Discharge home with new script for orphenadrine    Abnormal lab values  Labs Reviewed - No data to display    Medications Given  Medications   orphenadrine injection 60 mg (has no administration in time range)   dexAMETHasone injection 12 mg (has no administration in time range)         Diagnosis  -- radiculopathy, cervical    Disposition and Plan  -- Disposition: home  -- Condition: stable  -- Follow-up: Patient to follow up with No, Primary Doctor in 1-2 days, and any specialists noted on discharge paperwork  -- I advised the patient that we have found no life threatening condition today  -- At this time, I believe the patient is clinically stable for discharge.   -- The patient acknowledges that close follow up with a MD is required   -- Patient agrees to comply with all instruction and direction given in the ER  -- Patient counseled on strict return precautions as discussed        Francoise Montana MD  08/29/24 6599

## 2024-08-29 NOTE — Clinical Note
"Darren Trinidad" Javan was seen and treated in our emergency department on 8/29/2024.  She may return to work on 08/30/2024.       If you have any questions or concerns, please don't hesitate to call.       RN    "

## 2024-08-29 NOTE — ED TRIAGE NOTES
C/o left hand numbness since Sunday and now having right hand numbness. Patient seen yesterday in ED and prescribed medications.

## 2024-11-19 ENCOUNTER — HOSPITAL ENCOUNTER (EMERGENCY)
Facility: HOSPITAL | Age: 44
Discharge: HOME | End: 2024-11-19
Attending: STUDENT IN AN ORGANIZED HEALTH CARE EDUCATION/TRAINING PROGRAM
Payer: MEDICAID

## 2024-11-19 VITALS
DIASTOLIC BLOOD PRESSURE: 63 MMHG | BODY MASS INDEX: 24.64 KG/M2 | TEMPERATURE: 98 F | OXYGEN SATURATION: 99 % | HEIGHT: 61 IN | WEIGHT: 130.5 LBS | HEART RATE: 69 BPM | SYSTOLIC BLOOD PRESSURE: 120 MMHG | RESPIRATION RATE: 18 BRPM

## 2024-11-19 DIAGNOSIS — M79.606 LEG PAIN: ICD-10-CM

## 2024-11-19 DIAGNOSIS — S86.112A GASTROCNEMIUS MUSCLE TEAR, LEFT, INITIAL ENCOUNTER: Primary | ICD-10-CM

## 2024-11-19 PROCEDURE — 63600175 PHARM REV CODE 636 W HCPCS: Performed by: STUDENT IN AN ORGANIZED HEALTH CARE EDUCATION/TRAINING PROGRAM

## 2024-11-19 PROCEDURE — 99284 EMERGENCY DEPT VISIT MOD MDM: CPT | Mod: 25

## 2024-11-19 PROCEDURE — 96372 THER/PROPH/DIAG INJ SC/IM: CPT | Performed by: STUDENT IN AN ORGANIZED HEALTH CARE EDUCATION/TRAINING PROGRAM

## 2024-11-19 RX ORDER — KETOROLAC TROMETHAMINE 30 MG/ML
30 INJECTION, SOLUTION INTRAMUSCULAR; INTRAVENOUS
Status: COMPLETED | OUTPATIENT
Start: 2024-11-19 | End: 2024-11-19

## 2024-11-19 RX ADMIN — KETOROLAC TROMETHAMINE 30 MG: 30 INJECTION, SOLUTION INTRAMUSCULAR; INTRAVENOUS at 12:11

## 2024-11-19 NOTE — ED TRIAGE NOTES
43 y.o. female presents to ER ED 01/ED 01A   Chief Complaint   Patient presents with    Fall     Patient reports slipping/falling while getting off the second step of a ladder.  Reports pain in left knee extending into left ankle and left lower back.  No medications taken PTA.

## 2024-11-19 NOTE — ED PROVIDER NOTES
Encounter Date: 11/18/2024       History     Chief Complaint   Patient presents with    Fall     Patient reports slipping/falling while getting off the second step of a ladder.  Reports pain in left knee extending into left ankle and left lower back.  No medications taken PTA.     HPI    44 yo F presents with L leg pain after fall at work. Pt was on a ladder at work and felt like she was going to lose her balance while moving something and tried to catch herself and felt like all of her weight went to her left leg and caused it to buckle. Pain mainly in knee and lower leg. Tried ice without improvement. Denies LOC or head injury. No pain in other extremities. Pain in L knee radiating up leg towards pain. No other complaints at this time.     Review of patient's allergies indicates:   Allergen Reactions    Iodinated contrast media      Past Medical History:   Diagnosis Date    Depression      History reviewed. No pertinent surgical history.  No family history on file.  Social History     Tobacco Use    Smoking status: Never   Substance Use Topics    Alcohol use: Yes     Comment: occ    Drug use: Never     Review of Systems   Eyes:  Negative for visual disturbance.   Respiratory:  Negative for shortness of breath.    Cardiovascular:  Negative for chest pain.   Gastrointestinal:  Negative for abdominal pain.   Musculoskeletal:  Positive for arthralgias, gait problem and myalgias. Negative for joint swelling and neck pain.   Skin:  Negative for rash and wound.   Neurological:  Negative for dizziness, syncope and light-headedness.   All other systems reviewed and are negative.      Physical Exam     Initial Vitals [11/19/24 0001]   BP Pulse Resp Temp SpO2   (!) 119/57 68 17 98 °F (36.7 °C) 99 %      MAP       --         Physical Exam    Constitutional: She appears well-developed.   HENT:   Head: Normocephalic and atraumatic.   Eyes: EOM are normal.   Neck:   Normal range of motion.  Cardiovascular:  Normal rate and  intact distal pulses.           Pulmonary/Chest: No respiratory distress.   Musculoskeletal:         General: Tenderness present. No edema.      Cervical back: Normal range of motion.      Right knee: Normal.      Left knee: No bony tenderness. Tenderness present. Normal alignment.      Right lower leg: Normal.      Left lower leg: Tenderness present. No bony tenderness.      Right ankle:      Right Achilles Tendon: Normal.      Left ankle: No deformity. Normal range of motion.      Left Achilles Tendon: Normal. Posey's test negative.     Neurological: She is alert and oriented to person, place, and time.   Skin: Skin is warm and dry.         ED Course   Procedures  Labs Reviewed - No data to display       Imaging Results              X-Ray Knee 3 View Left (Final result)  Result time 11/19/24 00:37:02      Final result by Joan Acevedo MD (11/19/24 00:37:02)                   Impression:      No radiographic evidence of acute osseous injury.      Electronically signed by: Joan Acevedo MD  Date:    11/19/2024  Time:    00:37               Narrative:    EXAMINATION:  XR KNEE 3 VIEW LEFT    CLINICAL HISTORY:  Pain in leg, unspecified    TECHNIQUE:  AP, lateral, and Merchant views of the left knee were performed.    COMPARISON:  None    FINDINGS:  Visualized osseous and articular structures appear intact.  Alignment appears within normal limits.  No large suprapatellar joint effusion.  No large retained radiopaque foreign body identified.                                       X-Ray Tibia Fibula 2 View Left (Final result)  Result time 11/19/24 00:38:05      Final result by Joan Acevedo MD (11/19/24 00:38:05)                   Impression:      No radiographic evidence of acute osseous injury.      Electronically signed by: Joan Acevedo MD  Date:    11/19/2024  Time:    00:38               Narrative:    EXAMINATION:  XR TIBIA FIBULA 2 VIEW LEFT    CLINICAL HISTORY:  Pain in leg, unspecified    TECHNIQUE:  AP and  lateral views of the left tibia and fibula were performed.    COMPARISON:  None.    FINDINGS:  Visualized osseous and articular structures appear intact.  Alignment appears within normal limits.  No large retained radiopaque foreign body identified.                                       Medications   ketorolac injection 30 mg (30 mg Intramuscular Given 11/19/24 0027)     Medical Decision Making  Amount and/or Complexity of Data Reviewed  Radiology: ordered and independent interpretation performed. Decision-making details documented in ED Course.    Risk  Prescription drug management.                                      Clinical Impression:  Final diagnoses:  [M79.606] Leg pain  [S86.112A] Gastrocnemius muscle tear, left, initial encounter (Primary)     Suspect partial gastroc tear based on exam  Achilles intact  XR without acute bony abnormality  Pain control  RICE   NSAIDs  WBAT  If symptoms do not improve recommend reevaluation and possible additional imaging  Return precautions given        ED Disposition Condition    Discharge Stable          ED Prescriptions    None       Follow-up Information       Follow up With Specialties Details Why Contact Info    [facility] - Emergency Dept Emergency Medicine Go to  As needed, If symptoms worsen [id number] [facility] [id number] [phone]             Kosta Miramontes MD  11/19/24 0217

## 2024-11-19 NOTE — Clinical Note
"Fawnharjit "Francisco Edouard was seen and treated in our emergency department on 11/18/2024.  She may return with limitations on 11/21/2024.  Light duty until cleared by a physician      Sincerely,      Kosta Miramontes MD    "

## 2025-05-22 ENCOUNTER — HOSPITAL ENCOUNTER (EMERGENCY)
Facility: HOSPITAL | Age: 45
Discharge: HOME | End: 2025-05-22
Payer: MEDICAID

## 2025-05-22 VITALS
RESPIRATION RATE: 17 BRPM | SYSTOLIC BLOOD PRESSURE: 101 MMHG | OXYGEN SATURATION: 100 % | DIASTOLIC BLOOD PRESSURE: 56 MMHG | HEART RATE: 65 BPM | TEMPERATURE: 98 F

## 2025-05-22 DIAGNOSIS — I10 HYPERTENSION: ICD-10-CM

## 2025-05-22 DIAGNOSIS — J06.9 UPPER RESPIRATORY TRACT INFECTION, UNSPECIFIED TYPE: Primary | ICD-10-CM

## 2025-05-22 DIAGNOSIS — R05.9 COUGH: ICD-10-CM

## 2025-05-22 LAB
B-HCG UR QL: NEGATIVE
BILIRUB UR QL STRIP.AUTO: NEGATIVE
CLARITY UR: CLEAR
COLOR UR AUTO: YELLOW
GLUCOSE UR QL STRIP: NEGATIVE
GROUP A STREP MOLECULAR (OHS): NEGATIVE
HGB UR QL STRIP: NEGATIVE
HOLD SPECIMEN: NORMAL
INFLUENZA A MOLECULAR (OHS): NEGATIVE
INFLUENZA B MOLECULAR (OHS): NEGATIVE
KETONES UR QL STRIP: NEGATIVE
LEUKOCYTE ESTERASE UR QL STRIP: NEGATIVE
NITRITE UR QL STRIP: NEGATIVE
PH UR STRIP: 6 [PH]
PROT UR QL STRIP: NEGATIVE
SARS-COV-2 RDRP RESP QL NAA+PROBE: NEGATIVE
SP GR UR STRIP: 1.02
UROBILINOGEN UR STRIP-ACNC: NEGATIVE EU/DL

## 2025-05-22 PROCEDURE — 81025 URINE PREGNANCY TEST: CPT | Performed by: NURSE PRACTITIONER

## 2025-05-22 PROCEDURE — U0002 COVID-19 LAB TEST NON-CDC: HCPCS | Performed by: NURSE PRACTITIONER

## 2025-05-22 PROCEDURE — 99284 EMERGENCY DEPT VISIT MOD MDM: CPT | Mod: 25

## 2025-05-22 PROCEDURE — 87502 INFLUENZA DNA AMP PROBE: CPT | Performed by: NURSE PRACTITIONER

## 2025-05-22 PROCEDURE — 81003 URINALYSIS AUTO W/O SCOPE: CPT | Performed by: NURSE PRACTITIONER

## 2025-05-22 PROCEDURE — 87651 STREP A DNA AMP PROBE: CPT | Performed by: NURSE PRACTITIONER

## 2025-05-22 RX ORDER — BENZONATATE 100 MG/1
100 CAPSULE ORAL 3 TIMES DAILY PRN
Qty: 20 CAPSULE | Refills: 0 | Status: SHIPPED | OUTPATIENT
Start: 2025-05-22 | End: 2025-06-01

## 2025-05-22 RX ORDER — ALBUTEROL SULFATE 90 UG/1
2 INHALANT RESPIRATORY (INHALATION) EVERY 6 HOURS PRN
Qty: 6.7 G | Refills: 0 | Status: SHIPPED | OUTPATIENT
Start: 2025-05-22

## 2025-05-22 RX ORDER — AMOXICILLIN AND CLAVULANATE POTASSIUM 875; 125 MG/1; MG/1
1 TABLET, FILM COATED ORAL 2 TIMES DAILY
Qty: 14 TABLET | Refills: 0 | Status: SHIPPED | OUTPATIENT
Start: 2025-05-22

## 2025-05-22 NOTE — ED PROVIDER NOTES
Encounter Date: 5/22/2025       History     Chief Complaint   Patient presents with    General Illness     PT to ED today with c/o sore throat and swollen throat since Tuesday. Also with reports of difficulty with breathing. O2 sat on arrival 99%. Denies c/o pain. States she has been taking TheraFlu as directed per her [facility] recommended but no improvement.      Chief complaint:  Cough, congestion   [age] year female with no significant medical history presents to be evaluated for a dry nonproductive cough sinus pressure and congestion for the last 3 days.  Also reports she had a nosebleed.  Reports white nasal discharge.  Reports that she has had some intermittent shortness of breath.  States that she has a sore throat as well but no difficulty swallowing.  She has been using TheraFlu      Review of patient's allergies indicates:   Allergen Reactions    Iodinated contrast media      Past Medical History:   Diagnosis Date    Depression      History reviewed. No pertinent surgical history.  No family history on file.  Social History[1]  Review of Systems   Constitutional:  Positive for chills, fatigue and fever.   HENT:  Positive for congestion, postnasal drip, rhinorrhea, sinus pressure and sinus pain.    Respiratory:  Positive for cough and shortness of breath.    Cardiovascular:  Negative for chest pain and palpitations.   Gastrointestinal:  Positive for nausea. Negative for diarrhea and vomiting.       Physical Exam     Initial Vitals [05/22/25 1200]   BP Pulse Resp Temp SpO2   132/71 67 12 98.4 °F (36.9 °C) 98 %      MAP       --         Physical Exam    Nursing note and vitals reviewed.  Constitutional: She appears well-developed and well-nourished.   HENT:   Head: Normocephalic and atraumatic. Mouth/Throat: Oropharynx is clear and moist.   Cardiovascular:  Normal rate and regular rhythm.           Pulmonary/Chest: Breath sounds normal. She has no wheezes. She has no rhonchi. She has no  rales.   Musculoskeletal:         General: Normal range of motion.     Neurological: She is alert and oriented to person, place, and time. She has normal strength.         ED Course   Procedures  Labs Reviewed   INFLUENZA A & B BY MOLECULAR - Normal       Result Value    INFLUENZA A MOLECULAR Negative      INFLUENZA B MOLECULAR  Negative     GROUP A STREP, MOLECULAR - Normal    Group A Strep Molecular Negative      Narrative:     Arcanobacterium haemolyticum and Beta Streptococcus group C and G will not be detected by this test method.  Please order Throat Culture ([id number]) if suspected.       SARS-COV-2 RNA AMPLIFICATION, QUAL - Normal    SARS COV-2 Molecular Negative     PREGNANCY TEST, URINE RAPID - Normal    hCG Qualitative, Urine Negative     URINALYSIS, REFLEX TO URINE CULTURE - Normal    Color, UA Yellow      Appearance, UA Clear      pH, UA 6.0      Spec Grav UA 1.025      Protein, UA Negative      Glucose, UA Negative      Ketones, UA Negative      Bilirubin, UA Negative      Blood, UA Negative      Nitrites, UA Negative      Urobilinogen, UA Negative      Leukocyte Esterase, UA Negative     GREY TOP URINE HOLD          Imaging Results              X-Ray Chest AP Portable (Final result)  Result time 05/22/25 12:50:35      Final result by Lilly Salgado MD (05/22/25 12:50:35)                   Impression:      No acute abnormality.      Electronically signed by: Lilly Salgado MD  Date:    05/22/2025  Time:    12:50               Narrative:    EXAMINATION:  XR CHEST AP PORTABLE    CLINICAL HISTORY:  Essential (primary) hypertension    TECHNIQUE:  Single frontal view of the chest was performed.    COMPARISON:  08/18/2024    FINDINGS:  The lungs are clear with normal appearance of pulmonary vasculature. No pleural effusion. No evident pneumothorax.    The cardiac silhouette is normal in size. The hilar and mediastinal contours are unremarkable.    Bones are intact.                                        Medications - No data to display  Medical Decision Making  44 year female presents to be evaluated for cough congestion sinus pressure runny nose and intermittent shortness of breath.  Most recently COVID, flu, strep, URI, bronchitis, pneumonia, sinusitis    Amount and/or Complexity of Data Reviewed  Radiology: ordered. Decision-making details documented in ED Course.  Discussion of management or test interpretation with external provider(s): Patient is well in appearance today   Vital signs stable  Afebrile    Physical exam exam unremarkable   Patient was swabbed in the ED for COVID, flu and strep and was negative for all   Chest x-ray unremarkable  Will treat for bacterial sinusitis  Will DC with supportive care and follow-up with PCP   Given return precautions        Risk  Prescription drug management.               ED Course as of 05/22/25 1309   Thu May 22, 2025   1254 X-Ray Chest AP Portable [CB]      ED Course User Index  [CB] Akiko Acosta NP                           Clinical Impression:  Final diagnoses:  [R05.9] Cough  [I10] Hypertension  [J06.9] Upper respiratory tract infection, unspecified type (Primary)          ED Disposition Condition    Discharge Stable          ED Prescriptions       Medication Sig Dispense Start Date End Date Auth. Provider    amoxicillin-clavulanate 875-125mg (AUGMENTIN) 875-125 mg per tablet Take 1 tablet by mouth 2 (two) times daily. 14 tablet 5/22/2025 -- Akiko Acosta NP    benzonatate (TESSALON) 100 MG capsule Take 1 capsule (100 mg total) by mouth 3 (three) times daily as needed for Cough. 20 capsule 5/22/2025 6/1/2025 Akiko Acosta NP    albuterol (PROVENTIL HFA) 90 mcg/actuation inhaler Inhale 2 puffs into the lungs every 6 (six) hours as needed. Rescue 6.7 g 5/22/2025 -- Akiko Acosta NP          Follow-up Information    None                [1]   Social History  Tobacco Use    Smoking status: Never   Substance Use Topics     Alcohol use: Yes     Comment: occ    Drug use: Never        Akiko Acosta, NP  05/22/25 8250

## 2025-05-22 NOTE — Clinical Note
"Darren"Francisco Edouard was seen and treated in our emergency department on 5/22/2025.  She may return to work on 05/24/2025.       If you have any questions or concerns, please don't hesitate to call.      Akiko Acosta NP"

## 2025-05-26 ENCOUNTER — PATIENT OUTREACH (OUTPATIENT)
Facility: OTHER | Age: 45
End: 2025-05-26
Payer: MEDICAID

## 2025-05-26 NOTE — PROGRESS NOTES
Cathie Correa, Patient Care Assistant  ED Navigator  Emergency Department    [place] [facility] [place]  Role: Community Health Worker    Date: 05/26/2025  Patient Name: Darren Edouard  MRN: [id number]  PCP: No, Primary Doctor    Assessment:     Darren Edouard is a 44 y.o. female who has presented to ED for general illness. Patient has visited the ED 1 times in the past 3 months. Patient does not have a PCP.     ED Navigator Initial Assessment    ED Navigator Enrollment Documentation  Consent to Services  Does patient consent to completing the assessment?: Yes  Contact  Method of Initial Contact: Phone  Transportation  Insurance Coverage  Do you have coverage/adequate coverage?: Yes  Specialist Appointment  Did the patient come to the ED to see a specialist?: No  Does the patient have a pending specialist referral?: No  Does the patient have a specialist appointment made?: No  PCP Follow Up Appointment  Medications  Is patient able to afford medication?: Yes  Psychological  Food  Communication/Education  Other Financial Concerns  Other Social Barriers/Concerns  Primary Barrier  Plan: The patient was given food bank/Pantry resources for their region.         Social History     Socioeconomic History    Marital status:     Number of children: 4   Tobacco Use    Smoking status: Never   Substance and Sexual Activity    Alcohol use: Yes     Comment: occ    Drug use: Never    Sexual activity: Yes     Partners: Male     Birth control/protection: None   Other Topics Concern    Patient feels they ought to cut down on drinking/drug use Yes     Comment: N/A    Patient annoyed by others criticizing their drinking/drug use Yes     Comment: N/A    Patient has felt bad or guilty about drinking/drug use Yes     Comment: N/A    Patient has had a drink/used drugs as an eye opener in the AM Yes     Comment: N/A     Social Drivers of Health     Financial Resource Strain: Low Risk  (5/26/2025)    Overall Financial Resource Strain  (CARDIA)     Difficulty of Paying Living Expenses: Not very hard   Food Insecurity: Food Insecurity Present (5/26/2025)    Hunger Vital Sign     Worried About Running Out of Food in the Last Year: Sometimes true     Ran Out of Food in the Last Year: Sometimes true   Transportation Needs: No Transportation Needs (5/26/2025)    PRAPARE - Transportation     Lack of Transportation (Medical): No     Lack of Transportation (Non-Medical): No   Physical Activity: Sufficiently Active (5/26/2025)    Exercise Vital Sign     Days of Exercise per Week: 3 days     Minutes of Exercise per Session: 60 min   Stress: Stress Concern Present (5/26/2025)    [employer] [place] [employer] - Occupational Stress Questionnaire     Feeling of Stress : Very much   Housing Stability: High Risk (5/26/2025)    Housing Stability Vital Sign     Unable to Pay for Housing in the Last Year: No     Number of Times Moved in the Last Year: 3     Homeless in the Last Year: No       Plan:       Pt is doing okay, but still congested on the right side. Pt expressed the albuterol has been assisting her breathing. Pt confirmed she does not have a PCP, and agreed to assistance with finding one, as well as scheduling an appt. Pt agreed to being scheduled with Deanna, and also agreed to date/time. ED navigator scheduled pt with Dr. Earl for 6/2/2025 @ 9:00 a.m. Pt could also use resources during this time with food. Pt also mentioned she does feel lonely at times, and is under a lot of stress. Pt agreed to a list of MH facilities being sent to her as well. Pt provided [email or web address] as her e-mail address; this was entered into epic for her as well, due to no e-mail being listed before. ED navigator ensured patient had no other needs at this time. ED navigator provided patient with the following via e-mail: her appointment details, list of food ball for the area, community food event info., information on how to apply for food stamps via  phone and eligibility info., a list of mental health facilities for her area and surrounding area, the [facility] On Call 24/7 Nurse triage line, [phone] or 1-866-[facility] ([phone]) contact information, and education on (The Right Care at the Right Level information, [facility] Virtual Visit information, and Heart healthy diet tips). ED navigator reminded patient to reach out if there is ever anything she can assist with. ED navigator plans to follow-up with patient on/around 5/30/2025.    Cathie Correa  ED Navigator  [phone]

## 2025-05-30 ENCOUNTER — PATIENT OUTREACH (OUTPATIENT)
Facility: OTHER | Age: 45
End: 2025-05-30
Payer: MEDICAID

## 2025-05-30 NOTE — PROGRESS NOTES
Pt is doing okay, but expressed her right side is still congested. Pt was reminded about and will be attending her appointment on Monday for 9:00 a.m. with Dr. Earl. Pt is wondering if her appointment does not go well, if she can changes providers if needed. ED navigator informed her she can, and she will follow-up with her the following week to see how it went, and assist if needed. ED navigator wished pt well with her appt. ED navigator ensured patient received the resources e-mailed. ED navigator ensured patient had no other needs at this time. ED navigator reminded patient to reach out if there is ever anything she can assist with. ED navigator plans to follow-up with patient on/around 6/10/2025.    Cathie Correa  ED Navigator  [phone]

## 2025-06-10 ENCOUNTER — PATIENT OUTREACH (OUTPATIENT)
Facility: OTHER | Age: 45
End: 2025-06-10
Payer: MEDICAID

## 2025-06-10 NOTE — PROGRESS NOTES
Pt is doing okay. Pt is satisfied with Dr. Earl being her PCP. Pt expressed her appointment went well, but she is needing to schedule another, as she is still having issues breathing. ED navigator sent the following to Dr. Earl's staff: Good afternoon, pt is still having troubles breathing and would like to schedule another appt., if someone can give her a call please. Thanks in advance. ED navigator ensured patient had no other needs at this time. ED navigator reminded patient to reach out if there is ever anything she can assist with. ED navigator plans to follow-up with patient on/around 6/17/2025.    Cathie Correa  ED Navigator  [phone]

## 2025-06-17 ENCOUNTER — PATIENT OUTREACH (OUTPATIENT)
Facility: OTHER | Age: 45
End: 2025-06-17
Payer: MEDICAID

## 2025-06-17 NOTE — PROGRESS NOTES
Pt is doing okay. Pt expressed she does not remember getting a voicemail from her PCP's office, and just remembers being told they did not have earlier availability for an appt. ED navigator ensured there was nothing she could help patient with today. ED navigator reminded patient to reach out if there is ever anything she can assist with. ED navigator plans to follow-up with patient on/around 7/11/2025.    Cathie Correa  ED Navigator  [phone]

## 2025-07-11 ENCOUNTER — PATIENT OUTREACH (OUTPATIENT)
Facility: OTHER | Age: 45
End: 2025-07-11
Payer: MEDICAID

## 2025-07-11 NOTE — PROGRESS NOTES
Pt is doing well. Pt was reminded about and will be attending her appointment on Monday with Dr. Earl for 1:15 p.m. ED navigator ensured there was nothing she could help patient with today. ED navigator plans to follow-up with patient on/around 8/27/2025.    Cathie Correa  ED Navigator  [phone]

## 2025-07-31 ENCOUNTER — HOSPITAL ENCOUNTER (EMERGENCY)
Facility: HOSPITAL | Age: 45
Discharge: HOME | End: 2025-07-31
Attending: SURGERY
Payer: MEDICAID

## 2025-07-31 VITALS
RESPIRATION RATE: 20 BRPM | BODY MASS INDEX: 24.5 KG/M2 | HEART RATE: 61 BPM | HEIGHT: 61 IN | SYSTOLIC BLOOD PRESSURE: 113 MMHG | TEMPERATURE: 99 F | OXYGEN SATURATION: 97 % | WEIGHT: 129.75 LBS | DIASTOLIC BLOOD PRESSURE: 79 MMHG

## 2025-07-31 DIAGNOSIS — K08.9 CHRONIC DENTAL PAIN: Primary | ICD-10-CM

## 2025-07-31 DIAGNOSIS — G89.29 CHRONIC DENTAL PAIN: Primary | ICD-10-CM

## 2025-07-31 DIAGNOSIS — K04.01 PULPITIS: ICD-10-CM

## 2025-07-31 PROCEDURE — 99284 EMERGENCY DEPT VISIT MOD MDM: CPT | Mod: 25

## 2025-07-31 PROCEDURE — 25000003 PHARM REV CODE 250: Performed by: SURGERY

## 2025-07-31 PROCEDURE — 63600175 PHARM REV CODE 636 W HCPCS: Mod: JZ,TB | Performed by: SURGERY

## 2025-07-31 PROCEDURE — 96372 THER/PROPH/DIAG INJ SC/IM: CPT | Performed by: SURGERY

## 2025-07-31 RX ORDER — HYDROCODONE BITARTRATE AND ACETAMINOPHEN 5; 325 MG/1; MG/1
1 TABLET ORAL EVERY 4 HOURS PRN
Qty: 15 TABLET | Refills: 0 | Status: SHIPPED | OUTPATIENT
Start: 2025-07-31 | End: 2025-08-03

## 2025-07-31 RX ORDER — HYDROCODONE BITARTRATE AND ACETAMINOPHEN 5; 325 MG/1; MG/1
1 TABLET ORAL EVERY 4 HOURS PRN
Qty: 15 TABLET | Refills: 0 | Status: SHIPPED | OUTPATIENT
Start: 2025-07-31 | End: 2025-07-31

## 2025-07-31 RX ORDER — AMOXICILLIN 875 MG/1
875 TABLET, COATED ORAL 2 TIMES DAILY
Qty: 14 TABLET | Refills: 0 | Status: SHIPPED | OUTPATIENT
Start: 2025-07-31 | End: 2025-08-07

## 2025-07-31 RX ORDER — ONDANSETRON 4 MG/1
4 TABLET, ORALLY DISINTEGRATING ORAL ONCE
Status: DISCONTINUED | OUTPATIENT
Start: 2025-07-31 | End: 2025-07-31

## 2025-07-31 RX ORDER — KETOROLAC TROMETHAMINE 30 MG/ML
60 INJECTION, SOLUTION INTRAMUSCULAR; INTRAVENOUS
Status: COMPLETED | OUTPATIENT
Start: 2025-07-31 | End: 2025-07-31

## 2025-07-31 RX ORDER — AMOXICILLIN 875 MG/1
875 TABLET, COATED ORAL 2 TIMES DAILY
Qty: 14 TABLET | Refills: 0 | Status: SHIPPED | OUTPATIENT
Start: 2025-07-31 | End: 2025-07-31

## 2025-07-31 RX ORDER — ONDANSETRON 4 MG/1
4 TABLET, ORALLY DISINTEGRATING ORAL ONCE
Status: COMPLETED | OUTPATIENT
Start: 2025-07-31 | End: 2025-07-31

## 2025-07-31 RX ADMIN — KETOROLAC TROMETHAMINE 60 MG: 60 INJECTION, SOLUTION INTRAMUSCULAR at 04:07

## 2025-07-31 RX ADMIN — ONDANSETRON 4 MG: 4 TABLET, ORALLY DISINTEGRATING ORAL at 04:07

## 2025-07-31 NOTE — ED PROVIDER NOTES
Encounter Date: 2025       History     Chief Complaint   Patient presents with    Dental Pain     Pt has been having dental pain x 2 weeks to right lower teeth. Also c/o right sided facial pain and ear pain. Has taken ibuprofen at 7a and 10a today. Has an appt Monday at [facility].      History of Present Illness  Darren Edouard is a 44 y.o. female that presents with dental pain tonight  Patient with dental pain today, chronic dental pain, no dental abscess noted  Patient with a 5/10 dental pain, worse with chewing meals in the last 2 weeks  Patient has no facial swelling or signs of dental abscess on ER evaluation  Pt has no airway concerns or other concerning findings on physical exam    The history is provided by the patient.     Review of patient's allergies indicates:   Allergen Reactions    Iodinated contrast media      Past Medical History:   Diagnosis Date    Anxiety disorder, unspecified     Depression      Past Surgical History:   Procedure Laterality Date     SECTION      L/Knee Surgery       Family History   Problem Relation Name Age of Onset    No Known Problems Mother          DM    No Known Problems Father       Social History[1]    Review of Systems   Constitutional: Negative.    HENT:  Positive for dental problem.    Eyes: Negative.    Respiratory: Negative.     Cardiovascular: Negative.    Gastrointestinal: Negative.    Genitourinary:  Negative for dysuria, urgency and vaginal discharge.   Skin: Negative.    Neurological: Negative.    Psychiatric/Behavioral: Negative.     All other systems reviewed and are negative.      Physical Exam     Initial Vitals [25 1524]   BP Pulse Resp Temp SpO2   124/74 74 20 98.2 °F (36.8 °C) 99 %     Physical Exam    Nursing note and vitals reviewed.  Constitutional: Vital signs are normal. She appears well-developed and well-nourished. She is cooperative.   HENT:   Head: Normocephalic and atraumatic.   (+) bilateral cavities in the upper &  lower molar areas with no facial swelling   Eyes: Conjunctivae, EOM and lids are normal. Pupils are equal, round, and reactive to light.   Neck: Trachea normal and phonation normal. Neck supple. No JVD present.   Normal range of motion.   Full passive range of motion without pain.     Cardiovascular:  Normal rate, regular rhythm, S1 normal, S2 normal, normal heart sounds, intact distal pulses and normal pulses.           Pulmonary/Chest: Effort normal and breath sounds normal.   Abdominal: Abdomen is soft and flat. Bowel sounds are normal.   Musculoskeletal:         General: Normal range of motion.      Cervical back: Full passive range of motion without pain, normal range of motion and neck supple.     Neurological: She is alert and oriented to person, place, and time. She has normal strength.   Skin: Skin is warm, dry and intact. Capillary refill takes less than 2 seconds.         ED Course   Procedures  Labs Reviewed - No data to display       Imaging Results    None          Medications   ketorolac injection 60 mg (has no administration in time range)     Medical Decision Making  Differential diagnosis includes cavity, pulpitis, gingivitis, dental abscess    Problems Addressed:  Chronic dental pain: complicated acute illness or injury  Pulpitis: complicated acute illness or injury    ED Management & Risks of Complication, Morbidity, & Mortality:  Toradol injection in the ER  Antibiotics & pain medicine prescription on discharge  Dentist follow-up with the next 48 hours outpatient  Pt/Family counseled to return to the ER with any concerns on DC    Need for Hospitalization or Surgery with Social Determinants of Health:  This patient does not need to be hospitalized on ER evaluation today  The patient's diagnosis is not limited by social determinants of health  Does not require surgery or procedure (major or minor), no risk factors    Final diagnoses:  [K08.9, G89.29] Chronic dental pain (Primary)  [K04.01]  Pulpitis          ED Disposition Condition    Discharge Stable          ED Prescriptions       Medication Sig Dispense Start Date End Date Auth. Provider    HYDROcodone-acetaminophen (NORCO) 5-325 mg per tablet  (Status: Discontinued) Take 1 tablet by mouth every 4 (four) hours as needed for Pain. 15 tablet 7/31/2025 7/31/2025 Keegan Lee MD    amoxicillin (AMOXIL) 875 MG tablet  (Status: Discontinued) Take 1 tablet (875 mg total) by mouth 2 (two) times daily. for 7 days 14 tablet 7/31/2025 7/31/2025 Keegan Lee MD    amoxicillin (AMOXIL) 875 MG tablet Take 1 tablet (875 mg total) by mouth 2 (two) times daily. for 7 days 14 tablet 7/31/2025 8/7/2025 Keegan Lee MD    HYDROcodone-acetaminophen (NORCO) 5-325 mg per tablet Take 1 tablet by mouth every 4 (four) hours as needed for Pain. 15 tablet 7/31/2025 8/3/2025 Keegan Lee MD          Follow-up Information       Follow up With Specialties Details Why Contact [place] Schedule an appointment as soon as possible for a visit in 2 days  [address] [place] [id number]  [phone]                     [1]   Social History  Tobacco Use    Smoking status: Never   Substance Use Topics    Alcohol use: Yes     Comment: occ    Drug use: Never        Keegan Lee MD  07/31/25 5097

## 2025-07-31 NOTE — Clinical Note
"Darren Nascimentoisaiah" Javan was seen and treated in our emergency department on 7/31/2025.  She may return to work on 08/01/2025.       If you have any questions or concerns, please don't hesitate to call.      AURORA Marcos RN    "

## 2025-08-27 ENCOUNTER — PATIENT OUTREACH (OUTPATIENT)
Facility: OTHER | Age: 45
End: 2025-08-27
Payer: MEDICAID